# Patient Record
Sex: FEMALE | Race: WHITE
[De-identification: names, ages, dates, MRNs, and addresses within clinical notes are randomized per-mention and may not be internally consistent; named-entity substitution may affect disease eponyms.]

---

## 2017-09-26 ENCOUNTER — HOSPITAL ENCOUNTER (INPATIENT)
Dept: HOSPITAL 74 - YASAS | Age: 52
LOS: 21 days | Discharge: HOME | DRG: 772 | End: 2017-10-17
Attending: PSYCHIATRY & NEUROLOGY | Admitting: PSYCHIATRY & NEUROLOGY
Payer: COMMERCIAL

## 2017-09-26 VITALS — BODY MASS INDEX: 23.6 KG/M2

## 2017-09-26 DIAGNOSIS — I10: ICD-10-CM

## 2017-09-26 DIAGNOSIS — F11.20: Primary | ICD-10-CM

## 2017-09-26 DIAGNOSIS — F17.210: ICD-10-CM

## 2017-09-26 DIAGNOSIS — F10.20: ICD-10-CM

## 2017-09-26 DIAGNOSIS — F31.9: ICD-10-CM

## 2017-09-26 DIAGNOSIS — F43.10: ICD-10-CM

## 2017-09-26 DIAGNOSIS — K29.70: ICD-10-CM

## 2017-09-26 DIAGNOSIS — F14.20: ICD-10-CM

## 2017-09-26 PROCEDURE — HZ42ZZZ GROUP COUNSELING FOR SUBSTANCE ABUSE TREATMENT, COGNITIVE-BEHAVIORAL: ICD-10-PCS | Performed by: PSYCHIATRY & NEUROLOGY

## 2017-09-26 RX ADMIN — FLUOCINONIDE SCH APPLIC: 0.5 OINTMENT TOPICAL at 22:23

## 2017-09-26 RX ADMIN — Medication SCH MG: at 22:25

## 2017-09-26 RX ADMIN — METOPROLOL TARTRATE SCH MG: 50 TABLET, FILM COATED ORAL at 22:24

## 2017-09-26 RX ADMIN — IBUPROFEN PRN MG: 400 TABLET, FILM COATED ORAL at 19:20

## 2017-09-26 RX ADMIN — HYDROXYZINE PAMOATE PRN MG: 50 CAPSULE ORAL at 19:20
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
## 2017-09-27 LAB
ALBUMIN SERPL-MCNC: 3.3 G/DL (ref 3.4–5)
ALP SERPL-CCNC: 67 U/L (ref 45–117)
ALT SERPL-CCNC: 20 U/L (ref 12–78)
ANION GAP SERPL CALC-SCNC: 7 MMOL/L (ref 8–16)
APPEARANCE UR: (no result)
AST SERPL-CCNC: 17 U/L (ref 15–37)
BACTERIA #/AREA URNS HPF: (no result) /HPF
BILIRUB SERPL-MCNC: 0.2 MG/DL (ref 0.2–1)
BILIRUB UR STRIP.AUTO-MCNC: NEGATIVE MG/DL
CALCIUM SERPL-MCNC: 9.4 MG/DL (ref 8.5–10.1)
CAOX CRY URNS QL MICRO: (no result) /HPF
CO2 SERPL-SCNC: 30 MMOL/L (ref 21–32)
COLOR UR: (no result)
CREAT SERPL-MCNC: 0.6 MG/DL (ref 0.55–1.02)
DEPRECATED RDW RBC AUTO: 14.2 % (ref 11.6–15.6)
GLUCOSE SERPL-MCNC: 85 MG/DL (ref 74–106)
KETONES UR QL STRIP: NEGATIVE
LEUKOCYTE ESTERASE UR QL STRIP.AUTO: (no result)
MCH RBC QN AUTO: 25.3 PG (ref 25.7–33.7)
MCHC RBC AUTO-ENTMCNC: 31.2 G/DL (ref 32–36)
MCV RBC: 81.2 FL (ref 80–96)
NITRITE UR QL STRIP: NEGATIVE
PH UR: 6 [PH] (ref 5–8)
PLATELET # BLD AUTO: 186 K/MM3 (ref 134–434)
PMV BLD: 10 FL (ref 7.5–11.1)
PROT SERPL-MCNC: 6.2 G/DL (ref 6.4–8.2)
PROT UR QL STRIP: NEGATIVE
PROT UR QL STRIP: NEGATIVE
RBC # BLD AUTO: 3 /HPF (ref 0–3)
RBC # UR STRIP: NEGATIVE /UL
SP GR UR: 1.01 (ref 1–1.02)
UROBILINOGEN UR STRIP-MCNC: NEGATIVE MG/DL (ref 0.2–1)
WBC # BLD AUTO: 4.8 K/MM3 (ref 4–10)
WBC # UR AUTO: 8 /HPF (ref 3–5)

## 2017-09-27 RX ADMIN — NICOTINE SCH: 21 PATCH TRANSDERMAL at 09:54

## 2017-09-27 RX ADMIN — AMITRIPTYLINE HYDROCHLORIDE SCH MG: 25 TABLET, FILM COATED ORAL at 21:56

## 2017-09-27 RX ADMIN — FLUOCINONIDE SCH APPLIC: 0.5 OINTMENT TOPICAL at 13:19

## 2017-09-27 RX ADMIN — ESCITALOPRAM OXALATE SCH MG: 10 TABLET, FILM COATED ORAL at 13:19

## 2017-09-27 RX ADMIN — FLUOCINONIDE SCH: 0.5 OINTMENT TOPICAL at 06:53

## 2017-09-27 RX ADMIN — AMITRIPTYLINE HYDROCHLORIDE SCH MG: 25 TABLET, FILM COATED ORAL at 13:19

## 2017-09-27 RX ADMIN — FLUOCINONIDE SCH APPLIC: 0.5 OINTMENT TOPICAL at 21:57

## 2017-09-27 RX ADMIN — PANTOPRAZOLE SODIUM SCH MG: 20 TABLET, DELAYED RELEASE ORAL at 09:54

## 2017-09-27 RX ADMIN — LIDOCAINE SCH PATCH: 50 PATCH TOPICAL at 09:54

## 2017-09-27 RX ADMIN — HYDROXYZINE PAMOATE PRN MG: 50 CAPSULE ORAL at 08:58

## 2017-09-27 RX ADMIN — Medication SCH TAB: at 09:54

## 2017-09-27 RX ADMIN — Medication SCH MG: at 21:56

## 2017-09-27 RX ADMIN — Medication SCH: at 21:57

## 2017-09-27 RX ADMIN — METOPROLOL TARTRATE SCH MG: 50 TABLET, FILM COATED ORAL at 21:56

## 2017-09-27 RX ADMIN — ASPIRIN SCH MG: 81 TABLET, COATED ORAL at 09:53

## 2017-09-27 RX ADMIN — METOPROLOL TARTRATE SCH MG: 50 TABLET, FILM COATED ORAL at 09:54

## 2017-09-27 RX ADMIN — IBUPROFEN PRN MG: 400 TABLET, FILM COATED ORAL at 21:58
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
## 2017-09-27 NOTE — EKG
Test Reason : 

Blood Pressure : ***/*** mmHG

Vent. Rate : 066 BPM     Atrial Rate : 066 BPM

   P-R Int : 180 ms          QRS Dur : 092 ms

    QT Int : 416 ms       P-R-T Axes : 058 064 015 degrees

   QTc Int : 436 ms

 

NORMAL SINUS RHYTHM

MINIMAL VOLTAGE CRITERIA FOR LVH, MAY BE NORMAL VARIANT

BORDERLINE ECG

NO PREVIOUS ECGS AVAILABLE

Confirmed by JIGAR ORTIZ MD ([id number]) on 9/27/2017 9:50:55 AM

 

Referred By:             Confirmed By:JIGAR ORTIZ MD

## 2017-09-28 RX ADMIN — AMITRIPTYLINE HYDROCHLORIDE SCH MG: 25 TABLET, FILM COATED ORAL at 21:45

## 2017-09-28 RX ADMIN — Medication SCH MG: at 21:45

## 2017-09-28 RX ADMIN — Medication SCH EACH: at 21:45

## 2017-09-28 RX ADMIN — HYDROXYZINE PAMOATE PRN MG: 50 CAPSULE ORAL at 21:47

## 2017-09-28 RX ADMIN — LIDOCAINE SCH PATCH: 50 PATCH TOPICAL at 09:03

## 2017-09-28 RX ADMIN — IBUPROFEN PRN MG: 400 TABLET, FILM COATED ORAL at 22:03

## 2017-09-28 RX ADMIN — AMLODIPINE BESYLATE SCH MG: 10 TABLET ORAL at 15:54

## 2017-09-28 RX ADMIN — METOPROLOL TARTRATE SCH MG: 50 TABLET, FILM COATED ORAL at 21:45

## 2017-09-28 RX ADMIN — FLUOCINONIDE SCH APPLIC: 0.5 OINTMENT TOPICAL at 06:45

## 2017-09-28 RX ADMIN — METOPROLOL TARTRATE SCH MG: 50 TABLET, FILM COATED ORAL at 09:02

## 2017-09-28 RX ADMIN — ASPIRIN SCH MG: 81 TABLET, COATED ORAL at 09:02

## 2017-09-28 RX ADMIN — FLUOCINONIDE SCH: 0.5 OINTMENT TOPICAL at 13:08

## 2017-09-28 RX ADMIN — AMITRIPTYLINE HYDROCHLORIDE SCH MG: 25 TABLET, FILM COATED ORAL at 13:08

## 2017-09-28 RX ADMIN — HYDROXYZINE PAMOATE PRN MG: 50 CAPSULE ORAL at 07:40

## 2017-09-28 RX ADMIN — AMITRIPTYLINE HYDROCHLORIDE SCH MG: 25 TABLET, FILM COATED ORAL at 06:44

## 2017-09-28 RX ADMIN — Medication SCH TAB: at 09:02

## 2017-09-28 RX ADMIN — ESCITALOPRAM OXALATE SCH MG: 10 TABLET, FILM COATED ORAL at 09:02

## 2017-09-28 RX ADMIN — FLUOCINONIDE SCH: 0.5 OINTMENT TOPICAL at 21:45

## 2017-09-28 RX ADMIN — NICOTINE SCH: 21 PATCH TRANSDERMAL at 09:04

## 2017-09-28 RX ADMIN — PANTOPRAZOLE SODIUM SCH MG: 20 TABLET, DELAYED RELEASE ORAL at 09:02

## 2017-09-29 RX ADMIN — FLUOCINONIDE SCH APPLIC: 0.5 OINTMENT TOPICAL at 15:05

## 2017-09-29 RX ADMIN — LIDOCAINE SCH PATCH: 50 PATCH TOPICAL at 10:40

## 2017-09-29 RX ADMIN — AMLODIPINE BESYLATE SCH: 10 TABLET ORAL at 11:07

## 2017-09-29 RX ADMIN — AMLODIPINE BESYLATE SCH MG: 10 TABLET ORAL at 08:27

## 2017-09-29 RX ADMIN — NICOTINE SCH: 21 PATCH TRANSDERMAL at 10:40

## 2017-09-29 RX ADMIN — PANTOPRAZOLE SODIUM SCH MG: 20 TABLET, DELAYED RELEASE ORAL at 10:39

## 2017-09-29 RX ADMIN — AMITRIPTYLINE HYDROCHLORIDE SCH MG: 25 TABLET, FILM COATED ORAL at 21:42

## 2017-09-29 RX ADMIN — FLUOCINONIDE SCH APPLIC: 0.5 OINTMENT TOPICAL at 21:43

## 2017-09-29 RX ADMIN — METOPROLOL TARTRATE SCH MG: 50 TABLET, FILM COATED ORAL at 21:42

## 2017-09-29 RX ADMIN — Medication SCH EACH: at 21:43

## 2017-09-29 RX ADMIN — QUETIAPINE FUMARATE SCH: 25 TABLET ORAL at 17:08

## 2017-09-29 RX ADMIN — GABAPENTIN SCH MG: 300 CAPSULE ORAL at 21:42

## 2017-09-29 RX ADMIN — AMITRIPTYLINE HYDROCHLORIDE SCH MG: 25 TABLET, FILM COATED ORAL at 06:20

## 2017-09-29 RX ADMIN — METOPROLOL TARTRATE SCH MG: 50 TABLET, FILM COATED ORAL at 10:39

## 2017-09-29 RX ADMIN — ESCITALOPRAM OXALATE SCH MG: 10 TABLET, FILM COATED ORAL at 10:39

## 2017-09-29 RX ADMIN — FLUOCINONIDE SCH: 0.5 OINTMENT TOPICAL at 06:41

## 2017-09-29 RX ADMIN — Medication SCH TAB: at 10:39

## 2017-09-29 RX ADMIN — GABAPENTIN SCH MG: 300 CAPSULE ORAL at 15:05

## 2017-09-29 RX ADMIN — AMITRIPTYLINE HYDROCHLORIDE SCH MG: 25 TABLET, FILM COATED ORAL at 15:04

## 2017-09-29 RX ADMIN — Medication SCH MG: at 21:42

## 2017-09-29 RX ADMIN — QUETIAPINE FUMARATE SCH MG: 25 TABLET ORAL at 15:04

## 2017-09-29 RX ADMIN — QUETIAPINE FUMARATE SCH MG: 100 TABLET ORAL at 21:42

## 2017-09-29 RX ADMIN — ASPIRIN SCH MG: 81 TABLET, COATED ORAL at 10:39

## 2017-09-29 NOTE — PN
Psychiatric Progress Note


Vital Signs: 


 Vital Signs











 Period  Temp  Pulse  Resp  BP Sys/[place]inas  Pulse Ox


 


 Last 24 Hr  97.8 F  52-56  16-16  162-162/92-96  











Date of Session: 09/29/17


Chief Complaint:: Bryant nervious and still suffering from sleeping difficulties. 


HPI: Alcohol,Cocaine and Opioid ependence comorbid with Bipolar disorder,PTSD.


ROS: Significant for Gastritis.


Current Medications: 


Active Medications











Generic Name Dose Route Start Last Admin





  Trade Name Freq  PRN Reason Stop Dose Admin


 


Acetaminophen  650 mg 09/26/17 16:50  





  Tylenol -  PO   





  Q4H PRN   





  PAIN   


 


Al Hydroxide/Mg Hydroxide  30 ml 09/26/17 16:50  





  Mylanta Oral Suspension -  PO   





  Q6H PRN   





  DYSPEPSIA   


 


Amitriptyline HCl  25 mg 09/27/17 14:00 09/29/17 15:04





  Elavil -  PO   25 mg





  TID ONUR   Administration


 


Amlodipine Besylate  10 mg 09/28/17 15:45 09/29/17 11:07





  Norvasc -  PO   Not Given





  DAILY ONUR   


 


Aspirin  81 mg 09/27/17 10:00 09/29/17 10:39





  Ecotrin -  PO   81 mg





  DAILY ONUR   Administration


 


Clonidine  0.1 mg 09/27/17 10:00 09/29/17 11:05





  Catapres -  PO   Not Given





  DAILY ONUR   


 


Diphenhydramine HCl  50 mg 09/26/17 16:50 09/26/17 22:24





  Benadryl -  PO   50 mg





  HSMR1 PRN   Administration





  INSOMNIA   


 


Escitalopram Oxalate  10 mg 09/27/17 12:00 09/29/17 10:39





  Lexapro -  PO   10 mg





  DAILY ONUR   Administration


 


Eucalyptus/Menthol/Phenol/Sorbitol  1 each 09/26/17 16:50  





  Cepastat Lozenge -  MM   





  Q4H PRN   





  SORE THROAT   


 


Fluocinonide  1 applic 09/26/17 22:00 09/29/17 15:05





  Lidex 0.05% Ointment -  TP   1 applic





  TID ONUR   Administration


 


Gabapentin  300 mg 09/29/17 14:00 09/29/17 15:05





  Neurontin -  PO   300 mg





  TID ONUR   Administration


 


Guaifenesin  10 ml 09/26/17 16:50  





  Robitussin Dm -  PO   





  Q6H PRN   





  COUGH   


 


Hydroxyzine Pamoate  50 mg 09/26/17 16:50 09/28/17 21:47





  Vistaril -  PO   50 mg





  Q4H PRN   Administration





  AGITATION   


 


Ibuprofen  400 mg 09/26/17 16:50 09/28/17 22:03





  Motrin -  PO   400 mg





  Q6H PRN   Administration





  SEVERE PAIN   


 


Lidocaine  1 patch 09/27/17 10:00 09/29/17 10:40





  Lidoderm Patch -  TP   1 patch





  DAILY ONUR   Administration


 


Loperamide HCl  4 mg 09/26/17 16:50  





  Imodium -  PO   





  Q6H PRN   





  DIARRHEA   


 


Magnesium Citrate  300 ml 09/26/17 16:50  





  Citroma -  PO   





  Q48H PRN   





  CONSTIPATION   


 


Magnesium Hydroxide  30 ml 09/26/17 16:50  





  Milk Of Magnesia -  PO   





  DAILY PRN   





  CONSTIPATION   


 


Metoprolol Tartrate  50 mg 09/26/17 22:00 09/29/17 10:39





  Lopressor -  PO   50 mg





  BID ONUR   Administration


 


Miscellaneous  1 each 09/27/17 22:00 09/28/17 21:45





  Lidoderm Patch Removal  MC   1 each





  DAILY@2200 ONUR   Administration


 


Nicotine  21 mg 09/27/17 10:00 09/29/17 10:40





  Nicoderm Patch -  TD   Not Given





  DAILY ONUR   


 


Nicotine Polacrilex  4 mg 09/26/17 16:50 09/27/17 09:55





  Nicorette Gum -  BC   4 mg





  Q2H PRN   Administration





  NICOTINE REPLACEMENT RX   


 


Pantoprazole Sodium  20 mg 09/27/17 10:00 09/29/17 10:39





  Protonix -  PO   20 mg





  DAILY ONUR   Administration


 


Prenatal Multivit/Folic Acid/Iron  1 tab 09/27/17 10:00 09/29/17 10:39





  Prenatal Vitamins (Sjr) -  PO   1 tab





  DAILY ONUR   Administration


 


Pseudoephedrine/Triprolidine  1 combo 09/26/17 16:50  





  Actifed -  PO   





  TID PRN   





  NASAL CONGESTION   


 


Quetiapine Fumarate  25 mg 09/29/17 14:00 09/29/17 15:04





  Seroquel -  PO   25 mg





  TID@1000,1400,1800 ONUR   Administration


 


Quetiapine Fumarate  100 mg 09/29/17 22:00  





  Seroquel -  PO   





  HS ONUR   


 


Thiamine HCl  100 mg 09/26/17 22:00 09/28/17 21:45





  Vitamin B1 -  PO   100 mg





  HS ONUR   Administration











Current Side Effect: No


Lab tests ordered: No


Lab tests reviewed: Yes


Provider note:: Chart was revuewed,patient was seen.Traetmen plan and 

medication managemnt  has been discussed with the patient.properties of 

Seroquel and Neurontin has discussed incliding side effects,benefits and dose 

adjustemnt.Seroquel 25 mg po tid and 100 mg po hs,Neurontin 300 mg po tid will 

be started today.Continue Lexapro 10 mg po daily and ElVIL 25 MG PO TID.  

SUPPORTIVE THERAPY PROVIDED.  Supportive therapy provided.


Total face to face time:: 30





Mental Status Exam





- Mental Status Exam


Alert and Oriented to: Time, Place, Person


Cognitive Function: Grossly Intact


Patient Appearance: Unkempt


Mood: Sad, Nervous


Affect: Mood Congruent, Labile


Patient Behavior: Cooperative


Speech Pattern: Clear


Voice Loudness: Normal


Thought Process: Goal Oriented


Thought Disorder: Not Present


Hallucinations: Denies


Suicidal Ideation: Denies


Homicidal Ideation: Denies


Insight/Judgement: Fair


Sleep: Difficulty falling asleep


Appetite: Good


Muscle strength/Tone: Normal


Gait/Station: Normal





Psychiatric Treatment Plan





- Problem List


(1) Cocaine dependence


Current Visit: Yes   Qualifiers: 


     Substance use status: uncomplicated        Qualified Code(s): F14.20 - 

Cocaine dependence, uncomplicated  





(2) Gastritis


Current Visit: Yes   Qualifiers: 


     Chronicity: unspecified     Gastritis bleeding: without bleeding





(3) Hypertension


Current Visit: Yes   Qualifiers: 


     Hypertension type: essential hypertension        Qualified Code(s): I10 - 

Essential (primary) hypertension  





(4) Nicotine dependence


Current Visit: Yes   Qualifiers: 


     Nicotine product type: cigarettes     Substance use status: uncomplicated 

       Qualified Code(s): F17.210 - Nicotine dependence, cigarettes, 

uncomplicated  





(5) PTSD (post-traumatic stress disorder)


Current Visit: Yes





(6) Bipolar disorder


Current Visit: Yes   Qualifiers: 


     Current episode severity: unspecified





(7) Heroin dependence


Current Visit: Yes





(8) Alcohol dependence


Current Visit: Yes

## 2017-09-30 RX ADMIN — PANTOPRAZOLE SODIUM SCH MG: 20 TABLET, DELAYED RELEASE ORAL at 10:18

## 2017-09-30 RX ADMIN — GABAPENTIN SCH MG: 300 CAPSULE ORAL at 06:39

## 2017-09-30 RX ADMIN — AMLODIPINE BESYLATE SCH MG: 10 TABLET ORAL at 10:17

## 2017-09-30 RX ADMIN — NICOTINE SCH: 21 PATCH TRANSDERMAL at 10:19

## 2017-09-30 RX ADMIN — FLUOCINONIDE SCH: 0.5 OINTMENT TOPICAL at 21:33

## 2017-09-30 RX ADMIN — Medication SCH MG: at 21:32

## 2017-09-30 RX ADMIN — ASPIRIN SCH MG: 81 TABLET, COATED ORAL at 10:18

## 2017-09-30 RX ADMIN — QUETIAPINE FUMARATE SCH MG: 100 TABLET ORAL at 21:32

## 2017-09-30 RX ADMIN — LIDOCAINE SCH PATCH: 50 PATCH TOPICAL at 10:17

## 2017-09-30 RX ADMIN — METOPROLOL TARTRATE SCH MG: 50 TABLET, FILM COATED ORAL at 21:32

## 2017-09-30 RX ADMIN — QUETIAPINE FUMARATE SCH MG: 25 TABLET ORAL at 13:39

## 2017-09-30 RX ADMIN — QUETIAPINE FUMARATE SCH MG: 25 TABLET ORAL at 10:18

## 2017-09-30 RX ADMIN — Medication SCH TAB: at 10:18

## 2017-09-30 RX ADMIN — GABAPENTIN SCH MG: 300 CAPSULE ORAL at 13:39

## 2017-09-30 RX ADMIN — AMITRIPTYLINE HYDROCHLORIDE SCH MG: 25 TABLET, FILM COATED ORAL at 06:39

## 2017-09-30 RX ADMIN — FLUOCINONIDE SCH: 0.5 OINTMENT TOPICAL at 13:40

## 2017-09-30 RX ADMIN — METOPROLOL TARTRATE SCH MG: 50 TABLET, FILM COATED ORAL at 10:18

## 2017-09-30 RX ADMIN — Medication SCH EACH: at 21:33

## 2017-09-30 RX ADMIN — ESCITALOPRAM OXALATE SCH MG: 10 TABLET, FILM COATED ORAL at 10:18

## 2017-09-30 RX ADMIN — AMITRIPTYLINE HYDROCHLORIDE SCH MG: 25 TABLET, FILM COATED ORAL at 21:32

## 2017-09-30 RX ADMIN — QUETIAPINE FUMARATE SCH MG: 25 TABLET ORAL at 17:15

## 2017-09-30 RX ADMIN — FLUOCINONIDE SCH APPLIC: 0.5 OINTMENT TOPICAL at 06:40

## 2017-09-30 RX ADMIN — GABAPENTIN SCH MG: 300 CAPSULE ORAL at 21:32

## 2017-09-30 RX ADMIN — AMITRIPTYLINE HYDROCHLORIDE SCH MG: 25 TABLET, FILM COATED ORAL at 13:39
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
## 2017-10-01 RX ADMIN — Medication SCH TAB: at 09:08

## 2017-10-01 RX ADMIN — METOPROLOL TARTRATE SCH MG: 50 TABLET, FILM COATED ORAL at 21:42

## 2017-10-01 RX ADMIN — GABAPENTIN SCH MG: 300 CAPSULE ORAL at 13:24

## 2017-10-01 RX ADMIN — PANTOPRAZOLE SODIUM SCH MG: 20 TABLET, DELAYED RELEASE ORAL at 09:08

## 2017-10-01 RX ADMIN — QUETIAPINE FUMARATE SCH MG: 100 TABLET ORAL at 21:42

## 2017-10-01 RX ADMIN — QUETIAPINE FUMARATE SCH MG: 25 TABLET ORAL at 13:24

## 2017-10-01 RX ADMIN — Medication SCH: at 21:43

## 2017-10-01 RX ADMIN — AMITRIPTYLINE HYDROCHLORIDE SCH MG: 25 TABLET, FILM COATED ORAL at 21:42

## 2017-10-01 RX ADMIN — FLUOCINONIDE SCH APPLIC: 0.5 OINTMENT TOPICAL at 13:24

## 2017-10-01 RX ADMIN — METOPROLOL TARTRATE SCH MG: 50 TABLET, FILM COATED ORAL at 09:08

## 2017-10-01 RX ADMIN — QUETIAPINE FUMARATE SCH MG: 25 TABLET ORAL at 17:45

## 2017-10-01 RX ADMIN — NICOTINE SCH: 21 PATCH TRANSDERMAL at 09:09

## 2017-10-01 RX ADMIN — ASPIRIN SCH MG: 81 TABLET, COATED ORAL at 09:08

## 2017-10-01 RX ADMIN — GABAPENTIN SCH MG: 300 CAPSULE ORAL at 06:46

## 2017-10-01 RX ADMIN — AMITRIPTYLINE HYDROCHLORIDE SCH MG: 25 TABLET, FILM COATED ORAL at 13:24

## 2017-10-01 RX ADMIN — GABAPENTIN SCH MG: 300 CAPSULE ORAL at 21:42

## 2017-10-01 RX ADMIN — ESCITALOPRAM OXALATE SCH MG: 10 TABLET, FILM COATED ORAL at 09:08

## 2017-10-01 RX ADMIN — FLUOCINONIDE SCH: 0.5 OINTMENT TOPICAL at 21:43

## 2017-10-01 RX ADMIN — Medication SCH MG: at 21:42

## 2017-10-01 RX ADMIN — QUETIAPINE FUMARATE SCH MG: 25 TABLET ORAL at 09:08

## 2017-10-01 RX ADMIN — FLUOCINONIDE SCH: 0.5 OINTMENT TOPICAL at 06:50

## 2017-10-01 RX ADMIN — AMITRIPTYLINE HYDROCHLORIDE SCH MG: 25 TABLET, FILM COATED ORAL at 06:46

## 2017-10-01 RX ADMIN — LIDOCAINE SCH PATCH: 50 PATCH TOPICAL at 09:08

## 2017-10-01 RX ADMIN — AMLODIPINE BESYLATE SCH MG: 10 TABLET ORAL at 09:08

## 2017-10-02 RX ADMIN — QUETIAPINE FUMARATE SCH MG: 25 TABLET ORAL at 13:20

## 2017-10-02 RX ADMIN — QUETIAPINE FUMARATE SCH MG: 25 TABLET ORAL at 18:10

## 2017-10-02 RX ADMIN — METOPROLOL TARTRATE SCH MG: 50 TABLET, FILM COATED ORAL at 10:06

## 2017-10-02 RX ADMIN — AMITRIPTYLINE HYDROCHLORIDE SCH MG: 25 TABLET, FILM COATED ORAL at 13:20

## 2017-10-02 RX ADMIN — ESCITALOPRAM OXALATE SCH MG: 10 TABLET, FILM COATED ORAL at 10:06

## 2017-10-02 RX ADMIN — GABAPENTIN SCH MG: 300 CAPSULE ORAL at 06:40

## 2017-10-02 RX ADMIN — FLUOCINONIDE SCH APPLIC: 0.5 OINTMENT TOPICAL at 06:40

## 2017-10-02 RX ADMIN — ASPIRIN SCH MG: 81 TABLET, COATED ORAL at 10:05

## 2017-10-02 RX ADMIN — FLUOCINONIDE SCH: 0.5 OINTMENT TOPICAL at 22:32

## 2017-10-02 RX ADMIN — PANTOPRAZOLE SODIUM SCH MG: 20 TABLET, DELAYED RELEASE ORAL at 10:06

## 2017-10-02 RX ADMIN — FLUOCINONIDE SCH APPLIC: 0.5 OINTMENT TOPICAL at 13:20

## 2017-10-02 RX ADMIN — QUETIAPINE FUMARATE SCH MG: 25 TABLET ORAL at 10:06

## 2017-10-02 RX ADMIN — QUETIAPINE FUMARATE SCH MG: 100 TABLET ORAL at 22:05

## 2017-10-02 RX ADMIN — METOPROLOL TARTRATE SCH MG: 50 TABLET, FILM COATED ORAL at 22:05

## 2017-10-02 RX ADMIN — NICOTINE SCH: 21 PATCH TRANSDERMAL at 10:06

## 2017-10-02 RX ADMIN — GABAPENTIN SCH MG: 300 CAPSULE ORAL at 13:20

## 2017-10-02 RX ADMIN — Medication SCH: at 22:32

## 2017-10-02 RX ADMIN — Medication SCH TAB: at 10:06

## 2017-10-02 RX ADMIN — LIDOCAINE SCH PATCH: 50 PATCH TOPICAL at 10:05

## 2017-10-02 RX ADMIN — Medication SCH MG: at 22:06

## 2017-10-02 RX ADMIN — HYDROXYZINE PAMOATE PRN MG: 50 CAPSULE ORAL at 18:12

## 2017-10-02 RX ADMIN — AMITRIPTYLINE HYDROCHLORIDE SCH MG: 25 TABLET, FILM COATED ORAL at 22:06

## 2017-10-02 RX ADMIN — GABAPENTIN SCH MG: 300 CAPSULE ORAL at 22:05

## 2017-10-02 RX ADMIN — AMLODIPINE BESYLATE SCH MG: 10 TABLET ORAL at 10:06

## 2017-10-02 RX ADMIN — AMITRIPTYLINE HYDROCHLORIDE SCH MG: 25 TABLET, FILM COATED ORAL at 06:40

## 2017-10-03 RX ADMIN — Medication SCH MG: at 21:32

## 2017-10-03 RX ADMIN — FLUOCINONIDE SCH: 0.5 OINTMENT TOPICAL at 13:20

## 2017-10-03 RX ADMIN — AMITRIPTYLINE HYDROCHLORIDE SCH MG: 25 TABLET, FILM COATED ORAL at 21:32

## 2017-10-03 RX ADMIN — GABAPENTIN SCH MG: 300 CAPSULE ORAL at 21:32

## 2017-10-03 RX ADMIN — GABAPENTIN SCH MG: 300 CAPSULE ORAL at 13:20

## 2017-10-03 RX ADMIN — GABAPENTIN SCH MG: 300 CAPSULE ORAL at 06:26

## 2017-10-03 RX ADMIN — METOPROLOL TARTRATE SCH MG: 50 TABLET, FILM COATED ORAL at 21:32

## 2017-10-03 RX ADMIN — QUETIAPINE FUMARATE SCH MG: 100 TABLET ORAL at 21:32

## 2017-10-03 RX ADMIN — FLUOCINONIDE SCH: 0.5 OINTMENT TOPICAL at 06:26

## 2017-10-03 RX ADMIN — PANTOPRAZOLE SODIUM SCH MG: 20 TABLET, DELAYED RELEASE ORAL at 10:31

## 2017-10-03 RX ADMIN — AMITRIPTYLINE HYDROCHLORIDE SCH MG: 25 TABLET, FILM COATED ORAL at 13:20

## 2017-10-03 RX ADMIN — METOPROLOL TARTRATE SCH MG: 50 TABLET, FILM COATED ORAL at 10:31

## 2017-10-03 RX ADMIN — Medication SCH TAB: at 10:30

## 2017-10-03 RX ADMIN — LIDOCAINE SCH PATCH: 50 PATCH TOPICAL at 10:30

## 2017-10-03 RX ADMIN — Medication SCH EACH: at 21:33

## 2017-10-03 RX ADMIN — QUETIAPINE FUMARATE SCH MG: 25 TABLET ORAL at 10:31

## 2017-10-03 RX ADMIN — AMITRIPTYLINE HYDROCHLORIDE SCH MG: 25 TABLET, FILM COATED ORAL at 06:26

## 2017-10-03 RX ADMIN — ACETAMINOPHEN PRN MG: 325 TABLET ORAL at 06:27

## 2017-10-03 RX ADMIN — FLUOCINONIDE SCH: 0.5 OINTMENT TOPICAL at 21:32

## 2017-10-03 RX ADMIN — AMLODIPINE BESYLATE SCH MG: 10 TABLET ORAL at 10:31

## 2017-10-03 RX ADMIN — ESCITALOPRAM OXALATE SCH MG: 10 TABLET, FILM COATED ORAL at 10:30

## 2017-10-03 RX ADMIN — ASPIRIN SCH MG: 81 TABLET, COATED ORAL at 10:30

## 2017-10-03 RX ADMIN — NICOTINE SCH: 21 PATCH TRANSDERMAL at 10:31

## 2017-10-03 RX ADMIN — QUETIAPINE FUMARATE SCH MG: 25 TABLET ORAL at 17:45

## 2017-10-03 RX ADMIN — QUETIAPINE FUMARATE SCH MG: 25 TABLET ORAL at 13:20

## 2017-10-04 RX ADMIN — AMITRIPTYLINE HYDROCHLORIDE SCH MG: 25 TABLET, FILM COATED ORAL at 21:50

## 2017-10-04 RX ADMIN — FLUOCINONIDE SCH: 0.5 OINTMENT TOPICAL at 21:51

## 2017-10-04 RX ADMIN — AMITRIPTYLINE HYDROCHLORIDE SCH MG: 25 TABLET, FILM COATED ORAL at 13:20

## 2017-10-04 RX ADMIN — QUETIAPINE FUMARATE SCH MG: 25 TABLET ORAL at 13:20

## 2017-10-04 RX ADMIN — Medication SCH TAB: at 10:42

## 2017-10-04 RX ADMIN — NICOTINE SCH: 21 PATCH TRANSDERMAL at 10:43

## 2017-10-04 RX ADMIN — METOPROLOL TARTRATE SCH MG: 50 TABLET, FILM COATED ORAL at 10:42

## 2017-10-04 RX ADMIN — QUETIAPINE FUMARATE SCH MG: 25 TABLET ORAL at 10:42

## 2017-10-04 RX ADMIN — AMLODIPINE BESYLATE SCH MG: 10 TABLET ORAL at 10:42

## 2017-10-04 RX ADMIN — QUETIAPINE FUMARATE SCH MG: 25 TABLET ORAL at 17:50

## 2017-10-04 RX ADMIN — METOPROLOL TARTRATE SCH MG: 50 TABLET, FILM COATED ORAL at 21:51

## 2017-10-04 RX ADMIN — FLUOCINONIDE SCH APPLIC: 0.5 OINTMENT TOPICAL at 06:47

## 2017-10-04 RX ADMIN — ESCITALOPRAM OXALATE SCH MG: 10 TABLET, FILM COATED ORAL at 10:42

## 2017-10-04 RX ADMIN — QUETIAPINE FUMARATE SCH MG: 100 TABLET ORAL at 21:50

## 2017-10-04 RX ADMIN — GABAPENTIN SCH MG: 300 CAPSULE ORAL at 13:15

## 2017-10-04 RX ADMIN — GABAPENTIN SCH MG: 300 CAPSULE ORAL at 06:47

## 2017-10-04 RX ADMIN — FLUOCINONIDE SCH: 0.5 OINTMENT TOPICAL at 13:21

## 2017-10-04 RX ADMIN — Medication SCH EACH: at 21:51

## 2017-10-04 RX ADMIN — AMITRIPTYLINE HYDROCHLORIDE SCH MG: 25 TABLET, FILM COATED ORAL at 06:47

## 2017-10-04 RX ADMIN — Medication SCH MG: at 21:50

## 2017-10-04 RX ADMIN — GABAPENTIN SCH MG: 300 CAPSULE ORAL at 21:50

## 2017-10-04 RX ADMIN — PANTOPRAZOLE SODIUM SCH MG: 20 TABLET, DELAYED RELEASE ORAL at 10:42

## 2017-10-04 RX ADMIN — LIDOCAINE SCH PATCH: 50 PATCH TOPICAL at 10:43

## 2017-10-04 RX ADMIN — ASPIRIN SCH MG: 81 TABLET, COATED ORAL at 10:42

## 2017-10-05 RX ADMIN — FLUOCINONIDE SCH APPLIC: 0.5 OINTMENT TOPICAL at 13:09

## 2017-10-05 RX ADMIN — ESCITALOPRAM OXALATE SCH MG: 10 TABLET, FILM COATED ORAL at 10:18

## 2017-10-05 RX ADMIN — NICOTINE SCH: 21 PATCH TRANSDERMAL at 10:18

## 2017-10-05 RX ADMIN — METOPROLOL TARTRATE SCH MG: 50 TABLET, FILM COATED ORAL at 10:18

## 2017-10-05 RX ADMIN — METOPROLOL TARTRATE SCH MG: 50 TABLET, FILM COATED ORAL at 21:31

## 2017-10-05 RX ADMIN — QUETIAPINE FUMARATE SCH MG: 100 TABLET ORAL at 21:31

## 2017-10-05 RX ADMIN — QUETIAPINE FUMARATE SCH MG: 25 TABLET ORAL at 13:08

## 2017-10-05 RX ADMIN — Medication SCH MG: at 21:32

## 2017-10-05 RX ADMIN — FLUOCINONIDE SCH APPLIC: 0.5 OINTMENT TOPICAL at 21:32

## 2017-10-05 RX ADMIN — FLUOCINONIDE SCH: 0.5 OINTMENT TOPICAL at 06:47

## 2017-10-05 RX ADMIN — Medication SCH TAB: at 10:18

## 2017-10-05 RX ADMIN — GABAPENTIN SCH MG: 300 CAPSULE ORAL at 06:46

## 2017-10-05 RX ADMIN — AMLODIPINE BESYLATE SCH MG: 10 TABLET ORAL at 10:19

## 2017-10-05 RX ADMIN — Medication SCH EACH: at 21:32

## 2017-10-05 RX ADMIN — LIDOCAINE SCH PATCH: 50 PATCH TOPICAL at 10:18

## 2017-10-05 RX ADMIN — AMITRIPTYLINE HYDROCHLORIDE SCH MG: 25 TABLET, FILM COATED ORAL at 13:08

## 2017-10-05 RX ADMIN — PANTOPRAZOLE SODIUM SCH MG: 20 TABLET, DELAYED RELEASE ORAL at 10:19

## 2017-10-05 RX ADMIN — QUETIAPINE FUMARATE SCH MG: 25 TABLET ORAL at 18:35

## 2017-10-05 RX ADMIN — ASPIRIN SCH MG: 81 TABLET, COATED ORAL at 10:19

## 2017-10-05 RX ADMIN — AMITRIPTYLINE HYDROCHLORIDE SCH MG: 25 TABLET, FILM COATED ORAL at 21:31

## 2017-10-05 RX ADMIN — QUETIAPINE FUMARATE SCH MG: 25 TABLET ORAL at 10:19

## 2017-10-05 RX ADMIN — AMITRIPTYLINE HYDROCHLORIDE SCH MG: 25 TABLET, FILM COATED ORAL at 06:46

## 2017-10-05 RX ADMIN — GABAPENTIN SCH MG: 300 CAPSULE ORAL at 13:08

## 2017-10-05 RX ADMIN — GABAPENTIN SCH MG: 300 CAPSULE ORAL at 21:31

## 2017-10-06 RX ADMIN — FLUOCINONIDE SCH APPLIC: 0.5 OINTMENT TOPICAL at 06:45

## 2017-10-06 RX ADMIN — ASPIRIN SCH MG: 81 TABLET, COATED ORAL at 10:45

## 2017-10-06 RX ADMIN — FLUOCINONIDE SCH APPLIC: 0.5 OINTMENT TOPICAL at 21:45

## 2017-10-06 RX ADMIN — AMITRIPTYLINE HYDROCHLORIDE SCH MG: 25 TABLET, FILM COATED ORAL at 13:27

## 2017-10-06 RX ADMIN — QUETIAPINE FUMARATE SCH MG: 25 TABLET ORAL at 17:25

## 2017-10-06 RX ADMIN — QUETIAPINE FUMARATE SCH MG: 100 TABLET ORAL at 21:44

## 2017-10-06 RX ADMIN — AMITRIPTYLINE HYDROCHLORIDE SCH MG: 25 TABLET, FILM COATED ORAL at 06:44

## 2017-10-06 RX ADMIN — LIDOCAINE SCH PATCH: 50 PATCH TOPICAL at 10:43

## 2017-10-06 RX ADMIN — QUETIAPINE FUMARATE SCH MG: 25 TABLET ORAL at 13:28

## 2017-10-06 RX ADMIN — GABAPENTIN SCH MG: 300 CAPSULE ORAL at 13:28

## 2017-10-06 RX ADMIN — Medication SCH EACH: at 21:30

## 2017-10-06 RX ADMIN — AMITRIPTYLINE HYDROCHLORIDE SCH MG: 25 TABLET, FILM COATED ORAL at 21:44

## 2017-10-06 RX ADMIN — GABAPENTIN SCH MG: 300 CAPSULE ORAL at 06:44

## 2017-10-06 RX ADMIN — AMLODIPINE BESYLATE SCH MG: 10 TABLET ORAL at 10:44

## 2017-10-06 RX ADMIN — PANTOPRAZOLE SODIUM SCH MG: 20 TABLET, DELAYED RELEASE ORAL at 10:45

## 2017-10-06 RX ADMIN — NICOTINE SCH: 21 PATCH TRANSDERMAL at 10:44

## 2017-10-06 RX ADMIN — ESCITALOPRAM OXALATE SCH MG: 10 TABLET, FILM COATED ORAL at 10:45

## 2017-10-06 RX ADMIN — Medication SCH TAB: at 10:44

## 2017-10-06 RX ADMIN — GABAPENTIN SCH MG: 300 CAPSULE ORAL at 21:44

## 2017-10-06 RX ADMIN — METOPROLOL TARTRATE SCH MG: 50 TABLET, FILM COATED ORAL at 21:44

## 2017-10-06 RX ADMIN — Medication SCH MG: at 21:44

## 2017-10-06 RX ADMIN — METOPROLOL TARTRATE SCH MG: 50 TABLET, FILM COATED ORAL at 10:45

## 2017-10-06 RX ADMIN — FLUOCINONIDE SCH APPLIC: 0.5 OINTMENT TOPICAL at 13:28

## 2017-10-06 RX ADMIN — QUETIAPINE FUMARATE SCH MG: 25 TABLET ORAL at 10:44

## 2017-10-07 RX ADMIN — GABAPENTIN SCH MG: 300 CAPSULE ORAL at 13:08

## 2017-10-07 RX ADMIN — METOPROLOL TARTRATE SCH MG: 50 TABLET, FILM COATED ORAL at 10:03

## 2017-10-07 RX ADMIN — METOPROLOL TARTRATE SCH MG: 50 TABLET, FILM COATED ORAL at 21:46

## 2017-10-07 RX ADMIN — AMITRIPTYLINE HYDROCHLORIDE SCH MG: 25 TABLET, FILM COATED ORAL at 21:46

## 2017-10-07 RX ADMIN — AMITRIPTYLINE HYDROCHLORIDE SCH MG: 25 TABLET, FILM COATED ORAL at 06:43

## 2017-10-07 RX ADMIN — FLUOCINONIDE SCH: 0.5 OINTMENT TOPICAL at 06:43

## 2017-10-07 RX ADMIN — ASPIRIN SCH MG: 81 TABLET, COATED ORAL at 10:02

## 2017-10-07 RX ADMIN — QUETIAPINE FUMARATE SCH MG: 25 TABLET ORAL at 13:08

## 2017-10-07 RX ADMIN — ESCITALOPRAM OXALATE SCH MG: 10 TABLET, FILM COATED ORAL at 10:03

## 2017-10-07 RX ADMIN — NICOTINE SCH: 21 PATCH TRANSDERMAL at 10:03

## 2017-10-07 RX ADMIN — QUETIAPINE FUMARATE SCH MG: 25 TABLET ORAL at 10:03

## 2017-10-07 RX ADMIN — LIDOCAINE SCH PATCH: 50 PATCH TOPICAL at 10:02

## 2017-10-07 RX ADMIN — GABAPENTIN SCH MG: 300 CAPSULE ORAL at 06:43

## 2017-10-07 RX ADMIN — Medication SCH EACH: at 21:46

## 2017-10-07 RX ADMIN — PANTOPRAZOLE SODIUM SCH MG: 20 TABLET, DELAYED RELEASE ORAL at 10:03

## 2017-10-07 RX ADMIN — AMLODIPINE BESYLATE SCH MG: 10 TABLET ORAL at 10:03

## 2017-10-07 RX ADMIN — Medication SCH TAB: at 10:03

## 2017-10-07 RX ADMIN — GABAPENTIN SCH MG: 300 CAPSULE ORAL at 21:45

## 2017-10-07 RX ADMIN — QUETIAPINE FUMARATE SCH MG: 25 TABLET ORAL at 17:23

## 2017-10-07 RX ADMIN — Medication SCH MG: at 21:46

## 2017-10-07 RX ADMIN — FLUOCINONIDE SCH: 0.5 OINTMENT TOPICAL at 13:08

## 2017-10-07 RX ADMIN — AMITRIPTYLINE HYDROCHLORIDE SCH MG: 25 TABLET, FILM COATED ORAL at 13:08

## 2017-10-07 RX ADMIN — FLUOCINONIDE SCH: 0.5 OINTMENT TOPICAL at 21:46

## 2017-10-07 RX ADMIN — QUETIAPINE FUMARATE SCH MG: 100 TABLET ORAL at 21:45

## 2017-10-08 RX ADMIN — AMLODIPINE BESYLATE SCH MG: 10 TABLET ORAL at 10:27

## 2017-10-08 RX ADMIN — Medication SCH MG: at 21:39

## 2017-10-08 RX ADMIN — Medication SCH TAB: at 10:27

## 2017-10-08 RX ADMIN — AMITRIPTYLINE HYDROCHLORIDE SCH MG: 25 TABLET, FILM COATED ORAL at 06:47

## 2017-10-08 RX ADMIN — AMITRIPTYLINE HYDROCHLORIDE SCH MG: 25 TABLET, FILM COATED ORAL at 14:02

## 2017-10-08 RX ADMIN — QUETIAPINE FUMARATE SCH MG: 25 TABLET ORAL at 10:27

## 2017-10-08 RX ADMIN — GABAPENTIN SCH MG: 300 CAPSULE ORAL at 06:47

## 2017-10-08 RX ADMIN — AMITRIPTYLINE HYDROCHLORIDE SCH MG: 25 TABLET, FILM COATED ORAL at 21:38

## 2017-10-08 RX ADMIN — QUETIAPINE FUMARATE SCH MG: 25 TABLET ORAL at 14:02

## 2017-10-08 RX ADMIN — FLUOCINONIDE SCH: 0.5 OINTMENT TOPICAL at 21:38

## 2017-10-08 RX ADMIN — FLUOCINONIDE SCH APPLIC: 0.5 OINTMENT TOPICAL at 06:47

## 2017-10-08 RX ADMIN — QUETIAPINE FUMARATE SCH MG: 25 TABLET ORAL at 17:07

## 2017-10-08 RX ADMIN — METOPROLOL TARTRATE SCH MG: 50 TABLET, FILM COATED ORAL at 21:38

## 2017-10-08 RX ADMIN — NICOTINE SCH: 21 PATCH TRANSDERMAL at 10:28

## 2017-10-08 RX ADMIN — Medication SCH EACH: at 21:38

## 2017-10-08 RX ADMIN — GABAPENTIN SCH MG: 300 CAPSULE ORAL at 14:02

## 2017-10-08 RX ADMIN — HYDROXYZINE PAMOATE PRN MG: 50 CAPSULE ORAL at 21:40

## 2017-10-08 RX ADMIN — FLUOCINONIDE SCH: 0.5 OINTMENT TOPICAL at 14:03

## 2017-10-08 RX ADMIN — ESCITALOPRAM OXALATE SCH MG: 10 TABLET, FILM COATED ORAL at 10:27

## 2017-10-08 RX ADMIN — METOPROLOL TARTRATE SCH MG: 50 TABLET, FILM COATED ORAL at 10:27

## 2017-10-08 RX ADMIN — GABAPENTIN SCH MG: 300 CAPSULE ORAL at 21:39

## 2017-10-08 RX ADMIN — PANTOPRAZOLE SODIUM SCH MG: 20 TABLET, DELAYED RELEASE ORAL at 10:27

## 2017-10-08 RX ADMIN — QUETIAPINE FUMARATE SCH MG: 100 TABLET ORAL at 21:39

## 2017-10-08 RX ADMIN — ASPIRIN SCH MG: 81 TABLET, COATED ORAL at 10:27

## 2017-10-08 RX ADMIN — LIDOCAINE SCH PATCH: 50 PATCH TOPICAL at 10:28

## 2017-10-09 LAB
APPEARANCE UR: (no result)
BACTERIA #/AREA URNS HPF: (no result) /HPF
BILIRUB UR STRIP.AUTO-MCNC: NEGATIVE MG/DL
COLOR UR: YELLOW
KETONES UR QL STRIP: NEGATIVE
LEUKOCYTE ESTERASE UR QL STRIP.AUTO: (no result)
NITRITE UR QL STRIP: NEGATIVE
PH UR: 6 [PH] (ref 5–8)
PROT UR QL STRIP: NEGATIVE
PROT UR QL STRIP: NEGATIVE
RBC # UR STRIP: NEGATIVE /UL
SP GR UR: 1.02 (ref 1–1.02)
UROBILINOGEN UR STRIP-MCNC: NEGATIVE MG/DL (ref 0.2–1)
WBC # UR AUTO: (no result) /UL (ref 3–5)

## 2017-10-09 RX ADMIN — QUETIAPINE FUMARATE SCH MG: 25 TABLET ORAL at 17:07

## 2017-10-09 RX ADMIN — MICONAZOLE NITRATE SCH SUPP: 100 SUPPOSITORY VAGINAL at 21:43

## 2017-10-09 RX ADMIN — FLUOCINONIDE SCH: 0.5 OINTMENT TOPICAL at 21:43

## 2017-10-09 RX ADMIN — QUETIAPINE FUMARATE SCH MG: 25 TABLET ORAL at 13:16

## 2017-10-09 RX ADMIN — FLUOCINONIDE SCH: 0.5 OINTMENT TOPICAL at 13:17

## 2017-10-09 RX ADMIN — PANTOPRAZOLE SODIUM SCH MG: 20 TABLET, DELAYED RELEASE ORAL at 10:53

## 2017-10-09 RX ADMIN — LIDOCAINE SCH PATCH: 50 PATCH TOPICAL at 10:53

## 2017-10-09 RX ADMIN — Medication SCH EACH: at 21:43

## 2017-10-09 RX ADMIN — METOPROLOL TARTRATE SCH MG: 50 TABLET, FILM COATED ORAL at 10:53

## 2017-10-09 RX ADMIN — AMITRIPTYLINE HYDROCHLORIDE SCH MG: 25 TABLET, FILM COATED ORAL at 13:16

## 2017-10-09 RX ADMIN — AMITRIPTYLINE HYDROCHLORIDE SCH MG: 25 TABLET, FILM COATED ORAL at 21:42

## 2017-10-09 RX ADMIN — FLUOCINONIDE SCH APPLIC: 0.5 OINTMENT TOPICAL at 06:30

## 2017-10-09 RX ADMIN — Medication SCH MG: at 21:42

## 2017-10-09 RX ADMIN — ESCITALOPRAM OXALATE SCH MG: 10 TABLET, FILM COATED ORAL at 10:53

## 2017-10-09 RX ADMIN — AMITRIPTYLINE HYDROCHLORIDE SCH MG: 25 TABLET, FILM COATED ORAL at 06:30

## 2017-10-09 RX ADMIN — GABAPENTIN SCH MG: 300 CAPSULE ORAL at 13:16

## 2017-10-09 RX ADMIN — GABAPENTIN SCH MG: 300 CAPSULE ORAL at 21:42

## 2017-10-09 RX ADMIN — NICOTINE SCH: 21 PATCH TRANSDERMAL at 10:54

## 2017-10-09 RX ADMIN — AMLODIPINE BESYLATE SCH MG: 10 TABLET ORAL at 10:53

## 2017-10-09 RX ADMIN — Medication SCH TAB: at 10:53

## 2017-10-09 RX ADMIN — QUETIAPINE FUMARATE SCH MG: 25 TABLET ORAL at 10:53

## 2017-10-09 RX ADMIN — QUETIAPINE FUMARATE SCH MG: 100 TABLET ORAL at 21:42

## 2017-10-09 RX ADMIN — ASPIRIN SCH MG: 81 TABLET, COATED ORAL at 10:53

## 2017-10-09 RX ADMIN — GABAPENTIN SCH MG: 300 CAPSULE ORAL at 06:30

## 2017-10-09 RX ADMIN — METOPROLOL TARTRATE SCH MG: 50 TABLET, FILM COATED ORAL at 21:42

## 2017-10-10 RX ADMIN — AMITRIPTYLINE HYDROCHLORIDE SCH: 25 TABLET, FILM COATED ORAL at 21:37

## 2017-10-10 RX ADMIN — IBUPROFEN PRN MG: 400 TABLET, FILM COATED ORAL at 18:32

## 2017-10-10 RX ADMIN — Medication SCH TAB: at 10:45

## 2017-10-10 RX ADMIN — FLUOCINONIDE SCH APPLIC: 0.5 OINTMENT TOPICAL at 13:21

## 2017-10-10 RX ADMIN — GABAPENTIN SCH MG: 300 CAPSULE ORAL at 21:36

## 2017-10-10 RX ADMIN — QUETIAPINE FUMARATE SCH: 25 TABLET ORAL at 13:22

## 2017-10-10 RX ADMIN — ESCITALOPRAM OXALATE SCH MG: 10 TABLET, FILM COATED ORAL at 10:45

## 2017-10-10 RX ADMIN — METOPROLOL TARTRATE SCH MG: 50 TABLET, FILM COATED ORAL at 21:36

## 2017-10-10 RX ADMIN — GABAPENTIN SCH MG: 300 CAPSULE ORAL at 06:32

## 2017-10-10 RX ADMIN — NICOTINE SCH: 21 PATCH TRANSDERMAL at 10:45

## 2017-10-10 RX ADMIN — AMITRIPTYLINE HYDROCHLORIDE SCH MG: 25 TABLET, FILM COATED ORAL at 06:32

## 2017-10-10 RX ADMIN — AMITRIPTYLINE HYDROCHLORIDE SCH: 25 TABLET, FILM COATED ORAL at 13:22

## 2017-10-10 RX ADMIN — FLUOCINONIDE SCH: 0.5 OINTMENT TOPICAL at 21:37

## 2017-10-10 RX ADMIN — Medication SCH MG: at 21:36

## 2017-10-10 RX ADMIN — FLUOCINONIDE SCH APPLIC: 0.5 OINTMENT TOPICAL at 06:33

## 2017-10-10 RX ADMIN — GABAPENTIN SCH MG: 300 CAPSULE ORAL at 13:20

## 2017-10-10 RX ADMIN — QUETIAPINE FUMARATE SCH MG: 100 TABLET ORAL at 21:36

## 2017-10-10 RX ADMIN — ASPIRIN SCH MG: 81 TABLET, COATED ORAL at 10:45

## 2017-10-10 RX ADMIN — LIDOCAINE SCH PATCH: 50 PATCH TOPICAL at 10:45

## 2017-10-10 RX ADMIN — Medication SCH EACH: at 21:37

## 2017-10-10 RX ADMIN — METOPROLOL TARTRATE SCH MG: 50 TABLET, FILM COATED ORAL at 10:45

## 2017-10-10 RX ADMIN — QUETIAPINE FUMARATE SCH: 25 TABLET ORAL at 10:45

## 2017-10-10 RX ADMIN — PANTOPRAZOLE SODIUM SCH MG: 20 TABLET, DELAYED RELEASE ORAL at 10:45

## 2017-10-10 RX ADMIN — AMLODIPINE BESYLATE SCH MG: 10 TABLET ORAL at 10:45

## 2017-10-10 RX ADMIN — MICONAZOLE NITRATE SCH SUPP: 100 SUPPOSITORY VAGINAL at 21:37

## 2017-10-10 RX ADMIN — QUETIAPINE FUMARATE SCH MG: 25 TABLET ORAL at 18:32

## 2017-10-11 RX ADMIN — METOPROLOL TARTRATE SCH MG: 50 TABLET, FILM COATED ORAL at 10:57

## 2017-10-11 RX ADMIN — NAPROXEN SCH MG: 500 TABLET ORAL at 21:46

## 2017-10-11 RX ADMIN — ACETAMINOPHEN PRN MG: 325 TABLET ORAL at 17:27

## 2017-10-11 RX ADMIN — ASPIRIN SCH MG: 81 TABLET, COATED ORAL at 10:56

## 2017-10-11 RX ADMIN — AMLODIPINE BESYLATE SCH MG: 10 TABLET ORAL at 10:57

## 2017-10-11 RX ADMIN — AMITRIPTYLINE HYDROCHLORIDE SCH MG: 25 TABLET, FILM COATED ORAL at 06:11

## 2017-10-11 RX ADMIN — QUETIAPINE FUMARATE SCH MG: 25 TABLET ORAL at 10:57

## 2017-10-11 RX ADMIN — AMOXICILLIN SCH MG: 500 CAPSULE ORAL at 21:46

## 2017-10-11 RX ADMIN — FLUOCINONIDE SCH: 0.5 OINTMENT TOPICAL at 06:09

## 2017-10-11 RX ADMIN — Medication SCH TAB: at 10:56

## 2017-10-11 RX ADMIN — METOPROLOL TARTRATE SCH MG: 50 TABLET, FILM COATED ORAL at 21:46

## 2017-10-11 RX ADMIN — FLUOCINONIDE SCH: 0.5 OINTMENT TOPICAL at 13:51

## 2017-10-11 RX ADMIN — Medication SCH MG: at 21:46

## 2017-10-11 RX ADMIN — AMOXICILLIN SCH MG: 500 CAPSULE ORAL at 16:05

## 2017-10-11 RX ADMIN — PANTOPRAZOLE SODIUM SCH MG: 20 TABLET, DELAYED RELEASE ORAL at 10:56

## 2017-10-11 RX ADMIN — GABAPENTIN SCH MG: 300 CAPSULE ORAL at 13:49

## 2017-10-11 RX ADMIN — IBUPROFEN PRN MG: 400 TABLET, FILM COATED ORAL at 06:10

## 2017-10-11 RX ADMIN — AMITRIPTYLINE HYDROCHLORIDE SCH MG: 25 TABLET, FILM COATED ORAL at 13:49

## 2017-10-11 RX ADMIN — QUETIAPINE FUMARATE SCH MG: 25 TABLET ORAL at 13:49

## 2017-10-11 RX ADMIN — AMITRIPTYLINE HYDROCHLORIDE SCH: 25 TABLET, FILM COATED ORAL at 21:47

## 2017-10-11 RX ADMIN — GABAPENTIN SCH MG: 300 CAPSULE ORAL at 21:46

## 2017-10-11 RX ADMIN — ACETAMINOPHEN PRN MG: 325 TABLET ORAL at 08:56

## 2017-10-11 RX ADMIN — QUETIAPINE FUMARATE SCH MG: 25 TABLET ORAL at 17:27

## 2017-10-11 RX ADMIN — NICOTINE SCH: 21 PATCH TRANSDERMAL at 10:58

## 2017-10-11 RX ADMIN — Medication SCH EACH: at 21:47

## 2017-10-11 RX ADMIN — FLUOCINONIDE SCH: 0.5 OINTMENT TOPICAL at 21:47

## 2017-10-11 RX ADMIN — ESCITALOPRAM OXALATE SCH MG: 10 TABLET, FILM COATED ORAL at 10:57

## 2017-10-11 RX ADMIN — LIDOCAINE SCH PATCH: 50 PATCH TOPICAL at 10:57

## 2017-10-11 RX ADMIN — MICONAZOLE NITRATE SCH SUPP: 100 SUPPOSITORY VAGINAL at 21:47

## 2017-10-11 RX ADMIN — GABAPENTIN SCH MG: 300 CAPSULE ORAL at 06:09

## 2017-10-11 NOTE — PN
[facility] Progress Note


Note: 


dental pain , reporting abscess from missing tooth, started on amoxicillin, 

viscous lidocain and naprosyn atc with protonix advised to follow up with 

dentist when discharged, d/c anitbiotics when discharged.

## 2017-10-11 NOTE — PN
Psychiatric Progress Note


Vital Signs: 


 Vital Signs











 Period  Temp  Pulse  Resp  BP Sys/[place]inas  Pulse Ox


 


 Last 24 Hr  98.1 F  76-77  16-18  146-151/94-95  











Date of Session: 10/11/17


Chief Complaint:: Bryant not sleeping well ,it takes time to fall asleep."


HPI: Patient addressed Opioid,Alcohol and Cocaine dependence comorbid with 

Bipolar disorder,PTSD.


ROS: Significant for  HTN,Gastritis.


Current Medications: 


Active Medications











Generic Name Dose Route Start Last Admin





  Trade Name Freq  PRN Reason Stop Dose Admin


 


Acetaminophen  650 mg 09/26/17 16:50 10/11/17 08:56





  Tylenol -  PO   650 mg





  Q4H PRN   Administration





  PAIN   


 


Al Hydroxide/Mg Hydroxide  30 ml 09/26/17 16:50  





  Mylanta Oral Suspension -  PO   





  Q6H PRN   





  DYSPEPSIA   


 


Amitriptyline HCl  25 mg 09/27/17 14:00 10/11/17 06:11





  Elavil -  PO   25 mg





  TID ONUR   Administration


 


Amlodipine Besylate  10 mg 09/28/17 15:45 10/11/17 10:57





  Norvasc -  PO   10 mg





  DAILY ONUR   Administration


 


Aspirin  81 mg 09/27/17 10:00 10/11/17 10:56





  Ecotrin -  PO   81 mg





  DAILY ONUR   Administration


 


Clonidine  0.1 mg 09/27/17 10:00 10/11/17 10:56





  Catapres -  PO   0.1 mg





  DAILY ONUR   Administration


 


Clonidine  0.1 mg 10/01/17 07:04 10/11/17 06:09





  Catapres -  PO   0.1 mg





  BID PRN   Administration





  HYPERTENSION   


 


Diphenhydramine HCl  50 mg 09/26/17 16:50 09/26/17 22:24





  Benadryl -  PO   50 mg





  HSMR1 PRN   Administration





  INSOMNIA   


 


Escitalopram Oxalate  10 mg 09/27/17 12:00 10/11/17 10:57





  Lexapro -  PO   10 mg





  DAILY ONUR   Administration


 


Eucalyptus/Menthol/Phenol/Sorbitol  1 each 09/26/17 16:50  





  Cepastat Lozenge -  MM   





  Q4H PRN   





  SORE THROAT   


 


Fluocinonide  1 applic 09/26/17 22:00 10/11/17 06:09





  Lidex 0.05% Ointment -  TP   Not Given





  TID ONUR   


 


Gabapentin  300 mg 09/29/17 14:00 10/11/17 06:09





  Neurontin -  PO   300 mg





  TID ONUR   Administration


 


Guaifenesin  10 ml 09/26/17 16:50  





  Robitussin Dm -  PO   





  Q6H PRN   





  COUGH   


 


Hydroxyzine Pamoate  50 mg 09/26/17 16:50 10/08/17 21:40





  Vistaril -  PO   50 mg





  Q4H PRN   Administration





  AGITATION   


 


Ibuprofen  400 mg 09/26/17 16:50 10/11/17 06:10





  Motrin -  PO   400 mg





  Q6H PRN   Administration





  SEVERE PAIN   


 


Lidocaine  1 patch 09/27/17 10:00 10/11/17 10:57





  Lidoderm Patch -  TP   1 patch





  DAILY ONUR   Administration


 


Loperamide HCl  4 mg 09/26/17 16:50  





  Imodium -  PO   





  Q6H PRN   





  DIARRHEA   


 


Magnesium Citrate  300 ml 09/26/17 16:50  





  Citroma -  PO   





  Q48H PRN   





  CONSTIPATION   


 


Magnesium Hydroxide  30 ml 09/26/17 16:50  





  Milk Of Magnesia -  PO   





  DAILY PRN   





  CONSTIPATION   


 


Metoprolol Tartrate  50 mg 09/26/17 22:00 10/11/17 10:57





  Lopressor -  PO   50 mg





  BID ONUR   Administration


 


Miconazole Nitrate  100 mg 10/09/17 22:00 10/10/17 21:37





  Monistat-7 Vaginal Suppository -  PV   1 supp





  HS ONUR   Administration


 


Miscellaneous  1 each 09/27/17 22:00 10/10/17 21:37





  Lidoderm Patch Removal  MC   1 each





  DAILY@2200 ONUR   Administration


 


Nicotine  21 mg 09/27/17 10:00 10/11/17 10:58





  Nicoderm Patch -  TD   Not Given





  DAILY ONUR   


 


Nicotine Polacrilex  4 mg 09/26/17 16:50 09/27/17 09:55





  Nicorette Gum -  BC   4 mg





  Q2H PRN   Administration





  NICOTINE REPLACEMENT RX   


 


Pantoprazole Sodium  20 mg 09/27/17 10:00 10/11/17 10:56





  Protonix -  PO   20 mg





  DAILY ONUR   Administration


 


Prenatal Multivit/Folic Acid/Iron  1 tab 09/27/17 10:00 10/11/17 10:56





  Prenatal Vitamins (Sjr) -  PO   1 tab





  DAILY ONUR   Administration


 


Pseudoephedrine/Triprolidine  1 combo 09/26/17 16:50 10/09/17 15:29





  Actifed -  PO   1 combo





  TID PRN   Administration





  NASAL CONGESTION   


 


Quetiapine Fumarate  25 mg 09/29/17 14:00 10/11/17 10:57





  Seroquel -  PO   25 mg





  TID@1000,1400,1800 OUNR   Administration


 


Quetiapine Fumarate  150 mg 10/11/17 22:00  





  Seroquel -  PO   





  HS ONUR   


 


Thiamine HCl  100 mg 09/26/17 22:00 10/10/17 21:36





  Vitamin B1 -  PO   100 mg





  HS ONUR   Administration











Current Side Effect: No


Lab tests ordered: No


Lab tests reviewed: Yes


Provider note:: Chart was revuewed,patient was seen,treatment plan and 

medication management has been discussed with the patient.properties of 

Seroquel has been discussed with the patient including side effects,benefits 

and dose adjustment. Seroquel 100 mg po hs will be adjusted to 150 mg po 

hs.Continue ElAVIL 25 MG PO TID,Lexapro 10 mg po daily and Neurontin 300 mg po 

tid.  Supportive therapy provided focusing on relaxation techniques.


Total face to face time:: 30





Mental Status Exam





- Mental Status Exam


Alert and Oriented to: Time, Place, Person


Cognitive Function: Grossly Intact


Patient Appearance: Unkempt


Mood: Sad, Irritable


Affect: Mood Congruent, Labile


Patient Behavior: Cooperative


Speech Pattern: Clear


Voice Loudness: Normal


Thought Process: Goal Oriented


Thought Disorder: Not Present


Hallucinations: Denies


Suicidal Ideation: Denies


Homicidal Ideation: Denies


Insight/Judgement: Fair


Sleep: Difficulty falling asleep


Appetite: Fair


Muscle strength/Tone: Normal


Gait/Station: Normal





Psychiatric Treatment Plan





- Problem List


(1) Cocaine dependence


Current Visit: Yes   Qualifiers: 


     Substance use status: uncomplicated        Qualified Code(s): F14.20 - 

Cocaine dependence, uncomplicated; F14.20 - Cocaine dependence, uncomplicated; 

F14.20 - Cocaine dependence, uncomplicated  





(2) Gastritis


Current Visit: Yes   Qualifiers: 


     Chronicity: unspecified     Gastritis bleeding: without bleeding





(3) Hypertension


Current Visit: Yes   Qualifiers: 


     Hypertension type: essential hypertension        Qualified Code(s): I10 - 

Essential (primary) hypertension; I10 - Essential (primary) hypertension; I10 - 

Essential (primary) hypertension  





(4) Nicotine dependence


Current Visit: Yes   Qualifiers: 


     Nicotine product type: cigarettes     Substance use status: uncomplicated 

       Qualified Code(s): F17.210 - Nicotine dependence, cigarettes, 

uncomplicated; F17.210 - Nicotine dependence, cigarettes, uncomplicated  





(5) PTSD (post-traumatic stress disorder)


Current Visit: Yes





(6) Bipolar disorder


Current Visit: Yes   Qualifiers: 


     Current episode severity: unspecified





(7) Heroin dependence


Current Visit: Yes





(8) Alcohol dependence


Current Visit: Yes

## 2017-10-12 RX ADMIN — GABAPENTIN SCH MG: 300 CAPSULE ORAL at 21:47

## 2017-10-12 RX ADMIN — Medication SCH MG: at 21:48

## 2017-10-12 RX ADMIN — AMITRIPTYLINE HYDROCHLORIDE SCH MG: 25 TABLET, FILM COATED ORAL at 06:50

## 2017-10-12 RX ADMIN — NICOTINE SCH: 21 PATCH TRANSDERMAL at 10:32

## 2017-10-12 RX ADMIN — NAPROXEN SCH MG: 500 TABLET ORAL at 10:31

## 2017-10-12 RX ADMIN — Medication SCH TAB: at 10:32

## 2017-10-12 RX ADMIN — ASPIRIN SCH MG: 81 TABLET, COATED ORAL at 10:31

## 2017-10-12 RX ADMIN — GABAPENTIN SCH MG: 300 CAPSULE ORAL at 06:50

## 2017-10-12 RX ADMIN — METOPROLOL TARTRATE SCH MG: 50 TABLET, FILM COATED ORAL at 21:47

## 2017-10-12 RX ADMIN — Medication SCH EACH: at 21:48

## 2017-10-12 RX ADMIN — AMOXICILLIN SCH MG: 500 CAPSULE ORAL at 21:47

## 2017-10-12 RX ADMIN — QUETIAPINE FUMARATE SCH MG: 25 TABLET ORAL at 17:14

## 2017-10-12 RX ADMIN — AMITRIPTYLINE HYDROCHLORIDE SCH: 25 TABLET, FILM COATED ORAL at 13:18

## 2017-10-12 RX ADMIN — MICONAZOLE NITRATE SCH: 100 SUPPOSITORY VAGINAL at 21:57

## 2017-10-12 RX ADMIN — FLUOCINONIDE SCH APPLIC: 0.5 OINTMENT TOPICAL at 06:51

## 2017-10-12 RX ADMIN — AMOXICILLIN SCH MG: 500 CAPSULE ORAL at 13:18

## 2017-10-12 RX ADMIN — FLUOCINONIDE SCH: 0.5 OINTMENT TOPICAL at 21:48

## 2017-10-12 RX ADMIN — GABAPENTIN SCH MG: 300 CAPSULE ORAL at 13:18

## 2017-10-12 RX ADMIN — FLUOCINONIDE SCH: 0.5 OINTMENT TOPICAL at 13:18

## 2017-10-12 RX ADMIN — QUETIAPINE FUMARATE SCH MG: 25 TABLET ORAL at 13:19

## 2017-10-12 RX ADMIN — AMITRIPTYLINE HYDROCHLORIDE SCH: 25 TABLET, FILM COATED ORAL at 21:48

## 2017-10-12 RX ADMIN — QUETIAPINE FUMARATE SCH MG: 25 TABLET ORAL at 10:31

## 2017-10-12 RX ADMIN — AMLODIPINE BESYLATE SCH MG: 10 TABLET ORAL at 10:31

## 2017-10-12 RX ADMIN — ESCITALOPRAM OXALATE SCH MG: 10 TABLET, FILM COATED ORAL at 10:32

## 2017-10-12 RX ADMIN — NAPROXEN SCH MG: 500 TABLET ORAL at 21:47

## 2017-10-12 RX ADMIN — PANTOPRAZOLE SODIUM SCH MG: 40 TABLET, DELAYED RELEASE ORAL at 17:14

## 2017-10-12 RX ADMIN — LIDOCAINE SCH PATCH: 50 PATCH TOPICAL at 10:31

## 2017-10-12 RX ADMIN — METOPROLOL TARTRATE SCH MG: 50 TABLET, FILM COATED ORAL at 10:31

## 2017-10-12 RX ADMIN — AMOXICILLIN SCH MG: 500 CAPSULE ORAL at 06:50

## 2017-10-13 RX ADMIN — Medication SCH TAB: at 09:44

## 2017-10-13 RX ADMIN — FLUOCINONIDE SCH APPLIC: 0.5 OINTMENT TOPICAL at 06:06

## 2017-10-13 RX ADMIN — AMOXICILLIN SCH MG: 500 CAPSULE ORAL at 13:05

## 2017-10-13 RX ADMIN — METOPROLOL TARTRATE SCH MG: 50 TABLET, FILM COATED ORAL at 09:44

## 2017-10-13 RX ADMIN — AMOXICILLIN SCH MG: 500 CAPSULE ORAL at 21:55

## 2017-10-13 RX ADMIN — AMITRIPTYLINE HYDROCHLORIDE SCH MG: 25 TABLET, FILM COATED ORAL at 06:05

## 2017-10-13 RX ADMIN — Medication SCH MG: at 21:56

## 2017-10-13 RX ADMIN — QUETIAPINE FUMARATE SCH: 25 TABLET ORAL at 13:06

## 2017-10-13 RX ADMIN — METOPROLOL TARTRATE SCH MG: 50 TABLET, FILM COATED ORAL at 21:56

## 2017-10-13 RX ADMIN — MICONAZOLE NITRATE SCH: 100 SUPPOSITORY VAGINAL at 22:10

## 2017-10-13 RX ADMIN — GABAPENTIN SCH MG: 300 CAPSULE ORAL at 06:05

## 2017-10-13 RX ADMIN — NAPROXEN SCH MG: 500 TABLET ORAL at 21:56

## 2017-10-13 RX ADMIN — GABAPENTIN SCH MG: 300 CAPSULE ORAL at 21:55

## 2017-10-13 RX ADMIN — ESCITALOPRAM OXALATE SCH MG: 10 TABLET, FILM COATED ORAL at 09:44

## 2017-10-13 RX ADMIN — ASPIRIN SCH MG: 81 TABLET, COATED ORAL at 09:44

## 2017-10-13 RX ADMIN — LIDOCAINE SCH PATCH: 50 PATCH TOPICAL at 09:45

## 2017-10-13 RX ADMIN — NAPROXEN SCH MG: 500 TABLET ORAL at 09:45

## 2017-10-13 RX ADMIN — PANTOPRAZOLE SODIUM SCH MG: 40 TABLET, DELAYED RELEASE ORAL at 09:45

## 2017-10-13 RX ADMIN — GABAPENTIN SCH MG: 300 CAPSULE ORAL at 13:05

## 2017-10-13 RX ADMIN — AMITRIPTYLINE HYDROCHLORIDE SCH: 25 TABLET, FILM COATED ORAL at 13:06

## 2017-10-13 RX ADMIN — FLUOCINONIDE SCH: 0.5 OINTMENT TOPICAL at 13:06

## 2017-10-13 RX ADMIN — QUETIAPINE FUMARATE SCH MG: 25 TABLET ORAL at 09:45

## 2017-10-13 RX ADMIN — AMLODIPINE BESYLATE SCH MG: 10 TABLET ORAL at 09:44

## 2017-10-13 RX ADMIN — QUETIAPINE FUMARATE SCH MG: 25 TABLET ORAL at 17:48

## 2017-10-13 RX ADMIN — NICOTINE SCH: 21 PATCH TRANSDERMAL at 09:47

## 2017-10-13 RX ADMIN — FLUOCINONIDE SCH APPLIC: 0.5 OINTMENT TOPICAL at 21:55

## 2017-10-13 RX ADMIN — AMOXICILLIN SCH MG: 500 CAPSULE ORAL at 06:05

## 2017-10-13 RX ADMIN — Medication SCH EACH: at 22:09

## 2017-10-13 NOTE — PN
Psychiatric Progress Note


Vital Signs: 


 Vital Signs











 Period  Temp  Pulse  Resp  BP Sys/[place]  Pulse Ox


 


 Last 24 Hr  98.2 F  75-86  16-17  131-156/85-98  











Date of Session: 10/13/17


Chief Complaint:: Bryant not sleeping well,Bryant still anxious."


HPI: Cocaine,Opioid and Alcohol dependence comorbid with Bipolar disorder,PTSD.


ROS: Significant for HTN,Gastritis.


Current Medications: 


Active Medications











Generic Name Dose Route Start Last Admin





  Trade Name Freq  PRN Reason Stop Dose Admin


 


Acetaminophen  650 mg 09/26/17 16:50 10/11/17 17:27





  Tylenol -  PO   650 mg





  Q4H PRN   Administration





  PAIN   


 


Al Hydroxide/Mg Hydroxide  30 ml 09/26/17 16:50  





  Mylanta Oral Suspension -  PO   





  Q6H PRN   





  DYSPEPSIA   


 


Amlodipine Besylate  10 mg 09/28/17 15:45 10/13/17 09:44





  Norvasc -  PO   10 mg





  DAILY ONUR   Administration


 


Amoxicillin  500 mg 10/11/17 15:39 10/13/17 13:05





  Amoxicillin -  PO   500 mg





  TID ONUR   Administration


 


Aspirin  81 mg 09/27/17 10:00 10/13/17 09:44





  Ecotrin -  PO   81 mg





  DAILY ONUR   Administration


 


Clonidine  0.1 mg 09/27/17 10:00 10/13/17 09:45





  Catapres -  PO   0.1 mg





  DAILY ONUR   Administration


 


Clonidine  0.1 mg 10/01/17 07:04 10/12/17 06:50





  Catapres -  PO   0.1 mg





  BID PRN   Administration





  HYPERTENSION   


 


Diphenhydramine HCl  50 mg 09/26/17 16:50 09/26/17 22:24





  Benadryl -  PO   50 mg





  HSMR1 PRN   Administration





  INSOMNIA   


 


Escitalopram Oxalate  10 mg 09/27/17 12:00 10/13/17 09:44





  Lexapro -  PO   10 mg





  DAILY ONUR   Administration


 


Eucalyptus/Menthol/Phenol/Sorbitol  1 each 09/26/17 16:50  





  Cepastat Lozenge -  MM   





  Q4H PRN   





  SORE THROAT   


 


Fluocinonide  1 applic 09/26/17 22:00 10/13/17 13:06





  Lidex 0.05% Ointment -  TP   Not Given





  TID ONUR   


 


Gabapentin  300 mg 09/29/17 14:00 10/13/17 13:05





  Neurontin -  PO   300 mg





  TID ONUR   Administration


 


Guaifenesin  10 ml 09/26/17 16:50  





  Robitussin Dm -  PO   





  Q6H PRN   





  COUGH   


 


Hydroxyzine Pamoate  50 mg 09/26/17 16:50 10/08/17 21:40





  Vistaril -  PO   50 mg





  Q4H PRN   Administration





  AGITATION   


 


Lidocaine  1 patch 09/27/17 10:00 10/13/17 09:45





  Lidoderm Patch -  TP   1 patch





  DAILY ONUR   Administration


 


Lidocaine HCl  20 ml 10/11/17 14:50 10/11/17 16:05





  Xylocaine 2% Viscous Oral -  MM   20 ml





  Q6HPO PRN   Administration





  ORAL PAIN/MOUTH SORES   


 


Loperamide HCl  4 mg 09/26/17 16:50  





  Imodium -  PO   





  Q6H PRN   





  DIARRHEA   


 


Magnesium Citrate  300 ml 09/26/17 16:50  





  Citroma -  PO   





  Q48H PRN   





  CONSTIPATION   


 


Magnesium Hydroxide  30 ml 09/26/17 16:50  





  Milk Of Magnesia -  PO   





  DAILY PRN   





  CONSTIPATION   


 


Metoprolol Tartrate  50 mg 09/26/17 22:00 10/13/17 09:44





  Lopressor -  PO   50 mg





  BID ONUR   Administration


 


Miconazole Nitrate  100 mg 10/09/17 22:00 10/11/17 21:47





  Monistat-7 Vaginal Suppository -  PV   1 supp





  HS ONUR   Administration


 


Miscellaneous  1 each 09/27/17 22:00 10/12/17 21:48





  Lidoderm Patch Removal  MC   1 each





  DAILY@2200 ONUR   Administration


 


Naproxen  500 mg 10/11/17 22:00 10/13/17 09:45





  Naprosyn -  PO   500 mg





  BID ONUR   Administration


 


Nicotine  21 mg 09/27/17 10:00 10/13/17 09:47





  Nicoderm Patch -  TD   Not Given





  DAILY ONUR   


 


Nicotine Polacrilex  4 mg 09/26/17 16:50 09/27/17 09:55





  Nicorette Gum -  BC   4 mg





  Q2H PRN   Administration





  NICOTINE REPLACEMENT RX   


 


Pantoprazole Sodium  40 mg 10/12/17 15:39 10/13/17 09:45





  Protonix -  PO   40 mg





  DAILY ONUR   Administration


 


Prenatal Multivit/Folic Acid/Iron  1 tab 09/27/17 10:00 10/13/17 09:44





  Prenatal Vitamins (Sjr) -  PO   1 tab





  DAILY ONUR   Administration


 


Pseudoephedrine/Triprolidine  1 combo 09/26/17 16:50 10/09/17 15:29





  Actifed -  PO   1 combo





  TID PRN   Administration





  NASAL CONGESTION   


 


Quetiapine Fumarate  25 mg 09/29/17 14:00 10/13/17 13:06





  Seroquel -  PO   Not Given





  TID@1000,1400,1800 ONUR   


 


Quetiapine Fumarate  200 mg 10/13/17 22:00  





  Seroquel -  PO   





  HS ONUR   


 


Thiamine HCl  100 mg 09/26/17 22:00 10/12/17 21:48





  Vitamin B1 -  PO   100 mg





  HS ONUR   Administration











Current Side Effect: No


Lab tests ordered: No


Lab tests reviewed: Yes


Provider note:: Chart was revuewed,patient was evaluated,treatment plan 

including medication management has been discussed with the patient.Patient 

addressed sleeping difficulties as well as anxiety.She also reports that Elvil 

25 mg po tid was not effective for anxiety. Seroquel 150 mg po hs will be 

adjusted to 200 mg po hs.D/C Elavil 25 mg po tid.Supportive therapy has been 

provided.


Total face to face time:: 30





Mental Status Exam





- Mental Status Exam


Alert and Oriented to: Time, Place, Person


Cognitive Function: Grossly Intact


Patient Appearance: Unkempt


Mood: Sad, Nervous, Anxious, Expansive


Affect: Mood Congruent, Labile


Patient Behavior: Cooperative


Speech Pattern: Clear


Voice Loudness: Normal


Thought Process: Goal Oriented


Thought Disorder: Not Present


Hallucinations: Denies


Suicidal Ideation: Denies


Homicidal Ideation: Denies


Insight/Judgement: Fair


Sleep: Difficulty falling asleep


Appetite: Good


Muscle strength/Tone: Normal


Gait/Station: Normal





Psychiatric Treatment Plan





- Problem List


(1) Cocaine dependence


Current Visit: Yes   Qualifiers: 


     Substance use status: uncomplicated        Qualified Code(s): F14.20 - 

Cocaine dependence, uncomplicated; F14.20 - Cocaine dependence, uncomplicated; 

F14.20 - Cocaine dependence, uncomplicated  





(2) Gastritis


Current Visit: Yes   Qualifiers: 


     Chronicity: unspecified     Gastritis bleeding: without bleeding





(3) Hypertension


Current Visit: Yes   Qualifiers: 


     Hypertension type: essential hypertension        Qualified Code(s): I10 - 

Essential (primary) hypertension; I10 - Essential (primary) hypertension; I10 - 

Essential (primary) hypertension  





(4) Nicotine dependence


Current Visit: Yes   Qualifiers: 


     Nicotine product type: cigarettes     Substance use status: uncomplicated 

       Qualified Code(s): F17.210 - Nicotine dependence, cigarettes, 

uncomplicated; F17.210 - Nicotine dependence, cigarettes, uncomplicated  





(5) PTSD (post-traumatic stress disorder)


Current Visit: Yes





(6) Bipolar disorder


Current Visit: Yes   Qualifiers: 


     Current episode severity: unspecified





(7) Heroin dependence


Current Visit: Yes





(8) Alcohol dependence


Current Visit: Yes

## 2017-10-14 RX ADMIN — GABAPENTIN SCH MG: 300 CAPSULE ORAL at 13:45

## 2017-10-14 RX ADMIN — AMOXICILLIN SCH MG: 500 CAPSULE ORAL at 13:45

## 2017-10-14 RX ADMIN — AMOXICILLIN SCH MG: 500 CAPSULE ORAL at 06:37

## 2017-10-14 RX ADMIN — METOPROLOL TARTRATE SCH MG: 50 TABLET, FILM COATED ORAL at 10:31

## 2017-10-14 RX ADMIN — QUETIAPINE FUMARATE SCH MG: 25 TABLET ORAL at 10:31

## 2017-10-14 RX ADMIN — GABAPENTIN SCH MG: 300 CAPSULE ORAL at 21:47

## 2017-10-14 RX ADMIN — GABAPENTIN SCH MG: 300 CAPSULE ORAL at 06:37

## 2017-10-14 RX ADMIN — PANTOPRAZOLE SODIUM SCH MG: 40 TABLET, DELAYED RELEASE ORAL at 10:31

## 2017-10-14 RX ADMIN — Medication SCH MG: at 21:47

## 2017-10-14 RX ADMIN — NICOTINE SCH: 21 PATCH TRANSDERMAL at 10:32

## 2017-10-14 RX ADMIN — QUETIAPINE FUMARATE SCH: 25 TABLET ORAL at 13:45

## 2017-10-14 RX ADMIN — FLUOCINONIDE SCH: 0.5 OINTMENT TOPICAL at 21:48

## 2017-10-14 RX ADMIN — ESCITALOPRAM OXALATE SCH MG: 10 TABLET, FILM COATED ORAL at 10:32

## 2017-10-14 RX ADMIN — QUETIAPINE FUMARATE SCH MG: 25 TABLET ORAL at 18:25

## 2017-10-14 RX ADMIN — METOPROLOL TARTRATE SCH MG: 50 TABLET, FILM COATED ORAL at 21:47

## 2017-10-14 RX ADMIN — NAPROXEN SCH MG: 500 TABLET ORAL at 21:47

## 2017-10-14 RX ADMIN — FLUOCINONIDE SCH: 0.5 OINTMENT TOPICAL at 06:38

## 2017-10-14 RX ADMIN — Medication SCH TAB: at 10:32

## 2017-10-14 RX ADMIN — FLUOCINONIDE SCH: 0.5 OINTMENT TOPICAL at 13:45

## 2017-10-14 RX ADMIN — NAPROXEN SCH MG: 500 TABLET ORAL at 10:32

## 2017-10-14 RX ADMIN — LIDOCAINE SCH PATCH: 50 PATCH TOPICAL at 10:30

## 2017-10-14 RX ADMIN — AMLODIPINE BESYLATE SCH MG: 10 TABLET ORAL at 10:31

## 2017-10-14 RX ADMIN — MICONAZOLE NITRATE SCH: 100 SUPPOSITORY VAGINAL at 21:48

## 2017-10-14 RX ADMIN — ASPIRIN SCH MG: 81 TABLET, COATED ORAL at 10:32

## 2017-10-14 RX ADMIN — AMOXICILLIN SCH MG: 500 CAPSULE ORAL at 21:47

## 2017-10-14 RX ADMIN — Medication SCH EACH: at 21:48

## 2017-10-15 RX ADMIN — AMLODIPINE BESYLATE SCH MG: 10 TABLET ORAL at 10:04

## 2017-10-15 RX ADMIN — FLUOCINONIDE SCH: 0.5 OINTMENT TOPICAL at 13:16

## 2017-10-15 RX ADMIN — ESCITALOPRAM OXALATE SCH MG: 10 TABLET, FILM COATED ORAL at 10:04

## 2017-10-15 RX ADMIN — NAPROXEN SCH MG: 500 TABLET ORAL at 21:38

## 2017-10-15 RX ADMIN — NAPROXEN SCH MG: 500 TABLET ORAL at 10:03

## 2017-10-15 RX ADMIN — ACETAMINOPHEN PRN MG: 325 TABLET ORAL at 08:49

## 2017-10-15 RX ADMIN — GABAPENTIN SCH MG: 300 CAPSULE ORAL at 06:34

## 2017-10-15 RX ADMIN — QUETIAPINE FUMARATE SCH: 25 TABLET ORAL at 13:16

## 2017-10-15 RX ADMIN — QUETIAPINE FUMARATE SCH: 25 TABLET ORAL at 18:33

## 2017-10-15 RX ADMIN — Medication SCH TAB: at 10:03

## 2017-10-15 RX ADMIN — METOPROLOL TARTRATE SCH MG: 50 TABLET, FILM COATED ORAL at 21:38

## 2017-10-15 RX ADMIN — NICOTINE SCH: 21 PATCH TRANSDERMAL at 10:05

## 2017-10-15 RX ADMIN — AMOXICILLIN SCH MG: 500 CAPSULE ORAL at 06:34

## 2017-10-15 RX ADMIN — Medication SCH MG: at 21:37

## 2017-10-15 RX ADMIN — PANTOPRAZOLE SODIUM SCH MG: 40 TABLET, DELAYED RELEASE ORAL at 10:03

## 2017-10-15 RX ADMIN — GABAPENTIN SCH MG: 300 CAPSULE ORAL at 13:15

## 2017-10-15 RX ADMIN — MICONAZOLE NITRATE SCH: 100 SUPPOSITORY VAGINAL at 21:39

## 2017-10-15 RX ADMIN — METOPROLOL TARTRATE SCH MG: 50 TABLET, FILM COATED ORAL at 10:03

## 2017-10-15 RX ADMIN — QUETIAPINE FUMARATE SCH MG: 25 TABLET ORAL at 10:03

## 2017-10-15 RX ADMIN — LIDOCAINE SCH PATCH: 50 PATCH TOPICAL at 10:04

## 2017-10-15 RX ADMIN — Medication SCH EACH: at 21:39

## 2017-10-15 RX ADMIN — AMOXICILLIN SCH MG: 500 CAPSULE ORAL at 13:15

## 2017-10-15 RX ADMIN — GABAPENTIN SCH MG: 300 CAPSULE ORAL at 21:37

## 2017-10-15 RX ADMIN — FLUOCINONIDE SCH: 0.5 OINTMENT TOPICAL at 06:34

## 2017-10-15 RX ADMIN — AMOXICILLIN SCH MG: 500 CAPSULE ORAL at 21:38

## 2017-10-15 RX ADMIN — ASPIRIN SCH MG: 81 TABLET, COATED ORAL at 10:04

## 2017-10-15 RX ADMIN — FLUOCINONIDE SCH: 0.5 OINTMENT TOPICAL at 21:39

## 2017-10-16 RX ADMIN — NAPROXEN SCH MG: 500 TABLET ORAL at 21:27

## 2017-10-16 RX ADMIN — Medication SCH MG: at 21:27

## 2017-10-16 RX ADMIN — ASPIRIN SCH MG: 81 TABLET, COATED ORAL at 10:12

## 2017-10-16 RX ADMIN — PANTOPRAZOLE SODIUM SCH MG: 40 TABLET, DELAYED RELEASE ORAL at 10:13

## 2017-10-16 RX ADMIN — Medication SCH TAB: at 10:12

## 2017-10-16 RX ADMIN — GABAPENTIN SCH MG: 300 CAPSULE ORAL at 21:27

## 2017-10-16 RX ADMIN — NICOTINE SCH: 21 PATCH TRANSDERMAL at 10:14

## 2017-10-16 RX ADMIN — FLUOCINONIDE SCH: 0.5 OINTMENT TOPICAL at 21:28

## 2017-10-16 RX ADMIN — GABAPENTIN SCH MG: 300 CAPSULE ORAL at 13:37

## 2017-10-16 RX ADMIN — NAPROXEN SCH MG: 500 TABLET ORAL at 10:12

## 2017-10-16 RX ADMIN — METOPROLOL TARTRATE SCH MG: 50 TABLET, FILM COATED ORAL at 10:13

## 2017-10-16 RX ADMIN — QUETIAPINE FUMARATE SCH: 25 TABLET ORAL at 18:30

## 2017-10-16 RX ADMIN — LIDOCAINE SCH PATCH: 50 PATCH TOPICAL at 10:13

## 2017-10-16 RX ADMIN — QUETIAPINE FUMARATE SCH MG: 25 TABLET ORAL at 13:37

## 2017-10-16 RX ADMIN — QUETIAPINE FUMARATE SCH MG: 25 TABLET ORAL at 10:12

## 2017-10-16 RX ADMIN — MICONAZOLE NITRATE SCH: 100 SUPPOSITORY VAGINAL at 21:28

## 2017-10-16 RX ADMIN — METOPROLOL TARTRATE SCH MG: 50 TABLET, FILM COATED ORAL at 21:27

## 2017-10-16 RX ADMIN — AMOXICILLIN SCH MG: 500 CAPSULE ORAL at 06:49

## 2017-10-16 RX ADMIN — Medication SCH EACH: at 21:28

## 2017-10-16 RX ADMIN — GABAPENTIN SCH MG: 300 CAPSULE ORAL at 06:49

## 2017-10-16 RX ADMIN — AMLODIPINE BESYLATE SCH MG: 10 TABLET ORAL at 10:12

## 2017-10-16 RX ADMIN — FLUOCINONIDE SCH: 0.5 OINTMENT TOPICAL at 13:38

## 2017-10-16 RX ADMIN — AMOXICILLIN SCH MG: 500 CAPSULE ORAL at 21:27

## 2017-10-16 RX ADMIN — AMOXICILLIN SCH MG: 500 CAPSULE ORAL at 13:37

## 2017-10-16 RX ADMIN — FLUOCINONIDE SCH: 0.5 OINTMENT TOPICAL at 07:18

## 2017-10-16 RX ADMIN — ESCITALOPRAM OXALATE SCH MG: 10 TABLET, FILM COATED ORAL at 10:13

## 2017-10-16 NOTE — PN
Psychiatric Progress Note


Vital Signs: 


 Vital Signs











 Period  Temp  Pulse  Resp  BP Sys/[place]  Pulse Ox


 


 Last 24 Hr  97.9 F  77-80  18-18  143-155/91-94  











Date of Session: 10/16/17


Chief Complaint:: Discharge visit


HPI: Patient addressed Cocaine,Alcohol and Opioid dependence comorbid Bipolar 

disorder,PTSD.


ROS: Significant for HTN,Chronic gastritis.


Current Medications: 


Active Medications











Generic Name Dose Route Start Last Admin





  Trade Name Freq  PRN Reason Stop Dose Admin


 


Acetaminophen  650 mg 09/26/17 16:50 10/15/17 08:49





  Tylenol -  PO   650 mg





  Q4H PRN   Administration





  PAIN   


 


Al Hydroxide/Mg Hydroxide  30 ml 09/26/17 16:50  





  Mylanta Oral Suspension -  PO   





  Q6H PRN   





  DYSPEPSIA   


 


Amlodipine Besylate  10 mg 09/28/17 15:45 10/16/17 10:12





  Norvasc -  PO   10 mg





  DAILY ONUR   Administration


 


Amoxicillin  500 mg 10/11/17 15:39 10/16/17 13:37





  Amoxicillin -  PO   500 mg





  TID ONUR   Administration


 


Aspirin  81 mg 09/27/17 10:00 10/16/17 10:12





  Ecotrin -  PO   81 mg





  DAILY ONUR   Administration


 


Clonidine  0.1 mg 09/27/17 10:00 10/16/17 10:13





  Catapres -  PO   0.1 mg





  DAILY ONUR   Administration


 


Clonidine  0.1 mg 10/01/17 07:04 10/16/17 07:43





  Catapres -  PO   0.1 mg





  BID PRN   Administration





  HYPERTENSION   


 


Diphenhydramine HCl  50 mg 09/26/17 16:50 09/26/17 22:24





  Benadryl -  PO   50 mg





  HSMR1 PRN   Administration





  INSOMNIA   


 


Escitalopram Oxalate  10 mg 09/27/17 12:00 10/16/17 10:13





  Lexapro -  PO   10 mg





  DAILY ONUR   Administration


 


Eucalyptus/Menthol/Phenol/Sorbitol  1 each 09/26/17 16:50  





  Cepastat Lozenge -  MM   





  Q4H PRN   





  SORE THROAT   


 


Fluocinonide  1 applic 09/26/17 22:00 10/16/17 13:38





  Lidex 0.05% Ointment -  TP   Not Given





  TID ONUR   


 


Gabapentin  300 mg 09/29/17 14:00 10/16/17 13:37





  Neurontin -  PO   300 mg





  TID ONUR   Administration


 


Guaifenesin  10 ml 09/26/17 16:50  





  Robitussin Dm -  PO   





  Q6H PRN   





  COUGH   


 


Hydroxyzine Pamoate  50 mg 09/26/17 16:50 10/08/17 21:40





  Vistaril -  PO   50 mg





  Q4H PRN   Administration





  AGITATION   


 


Lidocaine  1 patch 09/27/17 10:00 10/16/17 10:13





  Lidoderm Patch -  TP   1 patch





  DAILY ONUR   Administration


 


Lidocaine HCl  20 ml 10/11/17 14:50 10/11/17 16:05





  Xylocaine 2% Viscous Oral -  MM   20 ml





  Q6HPO PRN   Administration





  ORAL PAIN/MOUTH SORES   


 


Loperamide HCl  4 mg 09/26/17 16:50  





  Imodium -  PO   





  Q6H PRN   





  DIARRHEA   


 


Magnesium Citrate  300 ml 09/26/17 16:50  





  Citroma -  PO   





  Q48H PRN   





  CONSTIPATION   


 


Magnesium Hydroxide  30 ml 09/26/17 16:50  





  Milk Of Magnesia -  PO   





  DAILY PRN   





  CONSTIPATION   


 


Metoprolol Tartrate  50 mg 09/26/17 22:00 10/16/17 10:13





  Lopressor -  PO   50 mg





  BID ONUR   Administration


 


Miconazole Nitrate  100 mg 10/09/17 22:00 10/15/17 21:39





  Monistat-7 Vaginal Suppository -  PV   Not Given





  HS ONUR   


 


Miscellaneous  1 each 09/27/17 22:00 10/15/17 21:39





  Lidoderm Patch Removal  MC   1 each





  DAILY@2200 ONUR   Administration


 


Naproxen  500 mg 10/11/17 22:00 10/16/17 10:12





  Naprosyn -  PO   500 mg





  BID ONUR   Administration


 


Nicotine  21 mg 09/27/17 10:00 10/16/17 10:14





  Nicoderm Patch -  TD   Not Given





  DAILY ONUR   


 


Nicotine Polacrilex  4 mg 09/26/17 16:50 09/27/17 09:55





  Nicorette Gum -  BC   4 mg





  Q2H PRN   Administration





  NICOTINE REPLACEMENT RX   


 


Pantoprazole Sodium  40 mg 10/12/17 15:39 10/16/17 10:13





  Protonix -  PO   40 mg





  DAILY ONUR   Administration


 


Prenatal Multivit/Folic Acid/Iron  1 tab 09/27/17 10:00 10/16/17 10:12





  Prenatal Vitamins (Sjr) -  PO   1 tab





  DAILY ONUR   Administration


 


Pseudoephedrine/Triprolidine  1 combo 09/26/17 16:50 10/09/17 15:29





  Actifed -  PO   1 combo





  TID PRN   Administration





  NASAL CONGESTION   


 


Quetiapine Fumarate  25 mg 09/29/17 14:00 10/16/17 13:37





  Seroquel -  PO   25 mg





  TID@1000,1400,1800 ONUR   Administration


 


Quetiapine Fumarate  200 mg 10/13/17 22:00 10/15/17 21:38





  Seroquel -  PO   200 mg





  HS ONUR   Administration


 


Thiamine HCl  100 mg 09/26/17 22:00 10/15/17 21:37





  Vitamin B1 -  PO   100 mg





  HS ONUR   Administration











Current Side Effect: No


Lab tests ordered: No


Lab tests reviewed: Yes


Provider note:: Patient will complete this program tomorrow 10/17/17.She has 

met her treatment goals and will continue to address her issues on outpatient 

basis .Patient reports finding that Seroquel 25 mg po tid and 200 mg po hs,

Neurontin 300 mg po tid and Lexapro 10 mg po daily help to cope with depressed 

mood ,anxiety,mood instability.Scripts for 30 days supply provided.  Patient 

iudentifies areas of difficulties,ways,behaviors she can utilize to maintain 

recovery.  Patient is stable for discharge tomorrow 10/17/17.


Total face to face time:: 30





Mental Status Exam





- Mental Status Exam


Alert and Oriented to: Time, Place, Person


Cognitive Function: Grossly Intact


Patient Appearance: Well Groomed


Mood: Hopeful, Euthymic


Affect: Appropriate, Mood Congruent


Patient Behavior: Appropriate, Cooperative


Speech Pattern: Clear


Voice Loudness: Normal


Thought Process: Goal Oriented


Thought Disorder: Not Present


Hallucinations: Denies


Suicidal Ideation: Denies


Homicidal Ideation: Denies


Insight/Judgement: Fair


Sleep: Fair


Appetite: Fair


Muscle strength/Tone: Normal


Gait/Station: Normal





Psychiatric Treatment Plan





- Problem List


(1) Cocaine dependence


Qualifiers: 


     Substance use status: uncomplicated        Qualified Code(s): F14.20 - 

Cocaine dependence, uncomplicated; F14.20 - Cocaine dependence, uncomplicated; 

F14.20 - Cocaine dependence, uncomplicated  





(2) Gastritis


Qualifiers: 


     Chronicity: unspecified     Gastritis bleeding: without bleeding





(3) Hypertension


Qualifiers: 


     Hypertension type: essential hypertension        Qualified Code(s): I10 - 

Essential (primary) hypertension; I10 - Essential (primary) hypertension; I10 - 

Essential (primary) hypertension  





(4) Nicotine dependence


Qualifiers: 


     Nicotine product type: cigarettes     Substance use status: uncomplicated 

       Qualified Code(s): F17.210 - Nicotine dependence, cigarettes, 

uncomplicated; F17.210 - Nicotine dependence, cigarettes, uncomplicated  








(6) Bipolar disorder


Qualifiers: 


     Current episode severity: unspecified

## 2017-10-17 VITALS — SYSTOLIC BLOOD PRESSURE: 147 MMHG | HEART RATE: 82 BPM | DIASTOLIC BLOOD PRESSURE: 98 MMHG

## 2017-10-17 VITALS — TEMPERATURE: 97.7 F

## 2017-10-17 RX ADMIN — GABAPENTIN SCH MG: 300 CAPSULE ORAL at 06:11

## 2017-10-17 RX ADMIN — ESCITALOPRAM OXALATE SCH MG: 10 TABLET, FILM COATED ORAL at 09:29

## 2017-10-17 RX ADMIN — AMOXICILLIN SCH MG: 500 CAPSULE ORAL at 06:11

## 2017-10-17 RX ADMIN — QUETIAPINE FUMARATE SCH MG: 25 TABLET ORAL at 09:29

## 2017-10-17 RX ADMIN — METOPROLOL TARTRATE SCH: 50 TABLET, FILM COATED ORAL at 09:30

## 2017-10-17 RX ADMIN — LIDOCAINE SCH PATCH: 50 PATCH TOPICAL at 09:29

## 2017-10-17 RX ADMIN — PANTOPRAZOLE SODIUM SCH MG: 40 TABLET, DELAYED RELEASE ORAL at 09:29

## 2017-10-17 RX ADMIN — Medication SCH TAB: at 09:29

## 2017-10-17 RX ADMIN — FLUOCINONIDE SCH: 0.5 OINTMENT TOPICAL at 06:11

## 2017-10-17 RX ADMIN — NICOTINE SCH: 21 PATCH TRANSDERMAL at 09:31

## 2017-10-17 RX ADMIN — AMLODIPINE BESYLATE SCH MG: 10 TABLET ORAL at 09:29

## 2017-10-17 RX ADMIN — NAPROXEN SCH MG: 500 TABLET ORAL at 09:29

## 2017-10-17 RX ADMIN — ASPIRIN SCH MG: 81 TABLET, COATED ORAL at 09:29

## 2018-04-04 ENCOUNTER — HOSPITAL ENCOUNTER (INPATIENT)
Dept: HOSPITAL 74 - YASAS | Age: 53
LOS: 4 days | Discharge: OTHER INSTITUTION | DRG: 773 | End: 2018-04-08
Attending: INTERNAL MEDICINE | Admitting: INTERNAL MEDICINE
Payer: COMMERCIAL

## 2018-04-04 VITALS — BODY MASS INDEX: 22.1 KG/M2

## 2018-04-04 DIAGNOSIS — R63.4: ICD-10-CM

## 2018-04-04 DIAGNOSIS — I10: ICD-10-CM

## 2018-04-04 DIAGNOSIS — Z91.5: ICD-10-CM

## 2018-04-04 DIAGNOSIS — E78.00: ICD-10-CM

## 2018-04-04 DIAGNOSIS — K29.70: ICD-10-CM

## 2018-04-04 DIAGNOSIS — F13.230: ICD-10-CM

## 2018-04-04 DIAGNOSIS — F17.210: ICD-10-CM

## 2018-04-04 DIAGNOSIS — G47.00: ICD-10-CM

## 2018-04-04 DIAGNOSIS — F11.23: Primary | ICD-10-CM

## 2018-04-04 DIAGNOSIS — F43.10: ICD-10-CM

## 2018-04-04 DIAGNOSIS — J45.22: ICD-10-CM

## 2018-04-04 DIAGNOSIS — F31.9: ICD-10-CM

## 2018-04-04 DIAGNOSIS — F10.230: ICD-10-CM

## 2018-04-04 DIAGNOSIS — K21.9: ICD-10-CM

## 2018-04-04 LAB
APPEARANCE UR: CLEAR
BILIRUB UR STRIP.AUTO-MCNC: NEGATIVE MG/DL
COLOR UR: (no result)
KETONES UR QL STRIP: NEGATIVE
LEUKOCYTE ESTERASE UR QL STRIP.AUTO: NEGATIVE
NITRITE UR QL STRIP: NEGATIVE
PH UR: 6 [PH] (ref 5–8)
PROT UR QL STRIP: NEGATIVE
PROT UR QL STRIP: NEGATIVE
RBC # UR STRIP: NEGATIVE /UL
SP GR UR: 1 (ref 1–1.03)
UROBILINOGEN UR STRIP-MCNC: NEGATIVE MG/DL (ref 0.2–1)

## 2018-04-04 PROCEDURE — HZ2ZZZZ DETOXIFICATION SERVICES FOR SUBSTANCE ABUSE TREATMENT: ICD-10-PCS | Performed by: INTERNAL MEDICINE

## 2018-04-04 RX ADMIN — NICOTINE SCH: 14 PATCH, EXTENDED RELEASE TRANSDERMAL at 15:20

## 2018-04-04 RX ADMIN — BUDESONIDE AND FORMOTEROL FUMARATE DIHYDRATE SCH PUFF: 80; 4.5 AEROSOL RESPIRATORY (INHALATION) at 22:16

## 2018-04-04 RX ADMIN — TRIAMCINOLONE ACETONIDE SCH: 1 OINTMENT TOPICAL at 17:46

## 2018-04-04 RX ADMIN — RANITIDINE SCH MG: 150 TABLET ORAL at 15:20

## 2018-04-04 RX ADMIN — TRIAMCINOLONE ACETONIDE SCH APPLIC: 1 OINTMENT TOPICAL at 16:03

## 2018-04-04 RX ADMIN — ZOLPIDEM TARTRATE PRN MG: 5 TABLET, COATED ORAL at 22:15

## 2018-04-04 RX ADMIN — RANITIDINE SCH MG: 150 TABLET ORAL at 22:17

## 2018-04-04 RX ADMIN — GABAPENTIN SCH MG: 300 CAPSULE ORAL at 15:16

## 2018-04-04 RX ADMIN — GABAPENTIN SCH MG: 300 CAPSULE ORAL at 22:16

## 2018-04-04 RX ADMIN — Medication SCH MG: at 22:17

## 2018-04-04 RX ADMIN — QUETIAPINE FUMARATE SCH MG: 25 TABLET ORAL at 22:16

## 2018-04-04 RX ADMIN — TRIAMCINOLONE ACETONIDE SCH APPLIC: 1 OINTMENT TOPICAL at 22:15
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
## 2018-04-04 NOTE — HP
CIWA Score





- CIWA Score


Nausea/Vomitin-Mild Nausea/No Vomiting


Muscle Tremors: 4-Moderate,w/Arms Extend


Anxiety: 4-Mod. Anxious/Guarded


Agitation: 4-Moderately Restless


Paroxysmal Sweats: 1-Minimal Palms Moist


Orientation: 0-Oriented


Tacttile Disturbances: 2-Mild Itch/Numbness/Burn


Auditory Disturbances: 0-None


Visual Disturbances: 0-None


Headache: 2-Mild


CIWA-Ar Total Score: 18





Admission ROS S





- HPI


Chief Complaint: 





withdrawal sx


Allergies/Adverse Reactions: 


 Allergies











Allergy/AdvReac Type Severity Reaction Status Date / Time


 


No Known Allergies Allergy   Verified 17 16:08











History of Present Illness: 





52 years old female with long history of alcohol cocaine dependence has eczema 

weight loss hypercholestrolemia gerd hypertension asthma neuropathy and 

depression is admitted to detox


Exam Limitations: No Limitations





- Ebola screening


Have you traveled outside of the country in the last 21 days: No


Have you had contact with anyone from an Ebola affected area: No


Have you been sick,other than usual withdrawal symptoms: No


Do you have a fever: No





- Review of Systems


Constitutional: Loss of Appetite, Changes in sleep, Unintentional Wgt. Loss, 

Unexplained wgt Loss


EENT: reports: Blurred Vision (eye glasses)


Respiratory: reports: SOB with Exertion


Cardiac: reports: No Symptoms Reported


GI: reports: Nausea, Poor Appetite, Poor Fluid Intake, Indigestion, Abdominal 

cramping


: reports: No Symptoms Reported


Musculoskeletal: reports: Joint Pain (right knee pain from fall "week" ago scab 

with hyperpigmented)


Integumentary: reports: Pruritus, Sweating (right knee)


Neuro: reports: Tremors


Endocrine: reports: No Symptoms Reported


Hematology: reports: No Symptoms Reported


Psychiatric: reports: Judgement Intact, Orientated x3, Depressed


Other Systems: Reviewed and Negative





Patient History





- Patient Medical History


Hx Anemia: Yes


Hx Asthma: Yes


Hx Chronic Obstructive Pulmonary Disease (COPD): No


Hx Cancer: No


Hx Cardiac Disorders: No


Hx Congestive Heart Failure: No


Hx Hypertension: Yes


Hx Hypercholesterolemia: Yes


Hx Pacemaker: No


HX Cerebrovascular Accident: No


Hx Seizures: No


Hx Dementia: No


Hx Diabetes: No


Hx Gastrointestinal Disorders: Yes


Hx Liver Disease: No


Hx Genitourinary Disorders: No


Hx Sexually Transmitted Disorders: No


Hx Renal Disease (ESRD): Yes


Hx Thyroid Disease: No


Hx Human Immunodeficiency Virus (HIV): No (negative)


Hx Hepatitis C: No (negative)


Hx Depression: No


Hx Suicide Attempt: Yes


Hx Bipolar Disorder: Yes


Hx Schizophrenia: No





- Patient Surgical History


Past Surgical History: No


Hx Neurologic Surgery: No


Hx Cataract Extraction: No


Hx Cardiac Surgery: No


Hx Lung Surgery: No


Hx Breast Surgery: No


Hx Breast Biopsy: No


Hx Abdominal Surgery: No


Hx Appendectomy: No


Hx Cholecystectomy: No


Hx Genitourinary Surgery: No


Hx  Section: No


Hx Orthopedic Surgery: No


Hx Hysterectomy: No





- PPD History


Previous Implant?: Yes


Documented Results: Negative w/proof


Implanted On Prior [facility] Admission?: Yes


Date: 17


PPD to be Administered?: No





- Reproductive History


Patient is a Female of Child Bearing Age (11 -55 yrs old): Yes


Last Menstrual Period: 17


Patient Pregnant: No





- Smoking Cessation


Smoking history: Current every day smoker


Have you smoked in the past 12 months: Yes


Aproximately how many cigarettes per day: 10


Hx Chewing Tobacco Use: No


Initiated information on smoking cessation: Yes


'Breaking Loose' booklet given: 18





- Substance & Tx. History


Hx Alcohol Use: Yes


Hx Substance Use: Yes


Substance Use Type: Alcohol, Cocaine, Heroin


Hx Substance Use Treatment: Yes (2017 [facility]





- Substances Abused


  ** Alcohol-vodka/beer


Route: Oral


Frequency: Daily


Amount used: 1 pt./2-6 pks.


Age of first use: 12


Date of Last Use: 18





  ** Xanax


Route: Oral


Frequency: 1-3 times last 30 days


Amount used: 2 mg.


Age of first use: 52


Date of Last Use: 18





Family Disease History





- Family Disease History


Family Disease History: Diabetes: Grandparent, Heart Disease: Grandparent, Other

: Father (no contact), Mother ()





Admission Physical Exam [facility]





- Vital Signs


Vital Signs: 


 Vital Signs - 24 hr











  18





  11:31


 


Temperature 98.6 F


 


Pulse Rate 62


 


Respiratory 18





Rate 


 


Blood Pressure 145/79














- Physical


General Appearance: Yes: Appropriately Dressed, Moderate Distress, Thin, 

Tremorous, Irritable, Sweating, Anxious


HEENTM: Yes: Hearing grossly Normal, Normal ENT Inspection, Normocephalic, 

Normal Voice, Other (eye glasses)


Respiratory: Yes: Chest Non-Tender, No Respiratory Distress, No Accessory 

Muscle Use, Wheezing, Expiration


Neck: Yes: Supple, Trachea in good position


Breast: Yes: Breasts Symetrical


Cardiology: Yes: Regular Rhythm, S1, S2, Bradycardia


Abdominal: Yes: Non Tender, Soft


Genitourinary: Yes: Within Normal Limits


Back: Yes: Normal Inspection


Musculoskeletal: Yes: full range of Motion, Gait Steady, Joint swelling (left 

knee)


Extremities: Yes: Normal Range of Motion, Non-Tender, Tremors, Swelling (right 

knee)


Neurological: Yes: Fully Oriented, Alert, Motor Strength 5/5, Normal Response, 

Depressed Affect


Integumentary: Yes: Warm, Other (right knee)


Lymphatic: Yes: Within Normal Limits





- Diagnostic


(1) GERD (gastroesophageal reflux disease)


Current Visit: Yes   Status: Chronic   


Qualifiers: 


   Esophagitis presence: without esophagitis   Qualified Code(s): K21.9 - Gastro

-esophageal reflux disease without esophagitis   





(2) Asthma


Current Visit: Yes   Status: Chronic   


Qualifiers: 


   Asthma severity: mild   Asthma persistence: intermittent   Asthma 

complication type: with status asthmaticus   Qualified Code(s): J45.22 - Mild 

intermittent asthma with status asthmaticus   





(3) Hypercholesteremia


Current Visit: Yes   Status: Chronic   





(4) Weight loss


Current Visit: Yes   Status: Acute   





(5) Alcohol dependence with uncomplicated withdrawal


Current Visit: Yes   Status: Acute   





(6) Bipolar disorder


Current Visit: Yes   Status: Suspected   


Qualifiers: 


   Current bipolar episode type: depressed   Current episode severity: 

unspecified 





(7) Hypertension


Current Visit: Yes   Status: Chronic   


Qualifiers: 


   Hypertension type: essential hypertension   Qualified Code(s): I10 - 

Essential (primary) hypertension   





(8) Methadone maintenance therapy patient


Current Visit: Yes   Status: Chronic   Comment: 60 mg po daily   





(9) Nicotine dependence


Current Visit: Yes   Status: Acute   


Qualifiers: 


   Nicotine product type: cigarettes   Substance use status: in withdrawal   

Qualified Code(s): F17.213 - Nicotine dependence, cigarettes, with withdrawal   





Cleared for Admission [facility]





- Detox or Rehab


[facility] Level of Care: Medically Managed


Detox Regimen/Protocol: Librium





[facility] Breath Alcohol Content


Breath Alcohol Content: 0





Urine Pregancy Test





- Result


Urine Pregnancy Test Results: Negative- NO Line Present





Urine Drug Screen





- Results


Drug Screen Negative: No


Urine Drug Screen Results: STACIA-Cocaine, OPI-Opiates, MTD-Methadone, TCA-

Tricyclic Antidepress

## 2018-04-05 LAB
ALBUMIN SERPL-MCNC: 3.2 G/DL (ref 3.4–5)
ALP SERPL-CCNC: 93 U/L (ref 45–117)
ALT SERPL-CCNC: 19 U/L (ref 12–78)
ANION GAP SERPL CALC-SCNC: 5 MMOL/L (ref 8–16)
AST SERPL-CCNC: 14 U/L (ref 15–37)
BILIRUB SERPL-MCNC: < 0.1 MG/DL (ref 0.2–1)
BUN SERPL-MCNC: 12 MG/DL (ref 7–18)
CALCIUM SERPL-MCNC: 9.1 MG/DL (ref 8.5–10.1)
CHLORIDE SERPL-SCNC: 104 MMOL/L (ref 98–107)
CO2 SERPL-SCNC: 33 MMOL/L (ref 21–32)
CREAT SERPL-MCNC: 0.7 MG/DL (ref 0.55–1.02)
DEPRECATED RDW RBC AUTO: 14.9 % (ref 11.6–15.6)
GLUCOSE SERPL-MCNC: 64 MG/DL (ref 74–106)
HCT VFR BLD CALC: 37.5 % (ref 32.4–45.2)
HGB BLD-MCNC: 12.4 GM/DL (ref 10.7–15.3)
MCH RBC QN AUTO: 26.7 PG (ref 25.7–33.7)
MCHC RBC AUTO-ENTMCNC: 33.1 G/DL (ref 32–36)
MCV RBC: 80.7 FL (ref 80–96)
PLATELET # BLD AUTO: 291 K/MM3 (ref 134–434)
PMV BLD: 10.1 FL (ref 7.5–11.1)
POTASSIUM SERPLBLD-SCNC: 4.1 MMOL/L (ref 3.5–5.1)
PROT SERPL-MCNC: 6.9 G/DL (ref 6.4–8.2)
RBC # BLD AUTO: 4.65 M/MM3 (ref 3.6–5.2)
SODIUM SERPL-SCNC: 142 MMOL/L (ref 136–145)
WBC # BLD AUTO: 5.4 K/MM3 (ref 4–10)

## 2018-04-05 RX ADMIN — GABAPENTIN SCH MG: 300 CAPSULE ORAL at 05:44

## 2018-04-05 RX ADMIN — BUDESONIDE AND FORMOTEROL FUMARATE DIHYDRATE SCH PUFF: 80; 4.5 AEROSOL RESPIRATORY (INHALATION) at 09:55

## 2018-04-05 RX ADMIN — AMLODIPINE BESYLATE SCH MG: 10 TABLET ORAL at 09:55

## 2018-04-05 RX ADMIN — QUETIAPINE FUMARATE SCH MG: 25 TABLET ORAL at 22:15

## 2018-04-05 RX ADMIN — ZOLPIDEM TARTRATE PRN MG: 5 TABLET, COATED ORAL at 22:15

## 2018-04-05 RX ADMIN — NICOTINE SCH: 14 PATCH, EXTENDED RELEASE TRANSDERMAL at 10:50

## 2018-04-05 RX ADMIN — GABAPENTIN SCH MG: 300 CAPSULE ORAL at 14:21

## 2018-04-05 RX ADMIN — TRIAMCINOLONE ACETONIDE SCH: 1 CREAM TOPICAL at 10:45

## 2018-04-05 RX ADMIN — GABAPENTIN SCH MG: 300 CAPSULE ORAL at 22:15

## 2018-04-05 RX ADMIN — RANITIDINE SCH MG: 150 TABLET ORAL at 22:15

## 2018-04-05 RX ADMIN — BUDESONIDE AND FORMOTEROL FUMARATE DIHYDRATE SCH PUFF: 80; 4.5 AEROSOL RESPIRATORY (INHALATION) at 22:17

## 2018-04-05 RX ADMIN — Medication SCH: at 22:17

## 2018-04-05 RX ADMIN — METHADONE HYDROCHLORIDE SCH MG: 40 TABLET ORAL at 05:40

## 2018-04-05 RX ADMIN — TRIAMCINOLONE ACETONIDE SCH APPLIC: 1 CREAM TOPICAL at 22:16

## 2018-04-05 RX ADMIN — TRIAMCINOLONE ACETONIDE SCH APPLIC: 1 OINTMENT TOPICAL at 09:55

## 2018-04-05 RX ADMIN — Medication SCH MG: at 22:15

## 2018-04-05 RX ADMIN — Medication SCH TAB: at 09:55

## 2018-04-05 RX ADMIN — LIDOCAINE SCH PATCH: 50 PATCH TOPICAL at 11:01

## 2018-04-05 RX ADMIN — ASPIRIN SCH MG: 81 TABLET, COATED ORAL at 09:55

## 2018-04-05 RX ADMIN — RANITIDINE SCH MG: 150 TABLET ORAL at 09:55

## 2018-04-05 NOTE — PN
S CIWA





- CIWA Score


Nausea/Vomiting: 3


Muscle Tremors: 3


Anxiety: 3


Agitation: 3


Paroxysmal Sweats: 3


Orientation: 0-Oriented


Tacttile Disturbances: 0-None


Auditory Disturbances: 0-None


Visual Disturbances: 0-None


Headache: 0-None Present


CIWA-Ar Total Score: 15





[facility] Progress Note (SOAP)


Subjective: 





nasuea, sweats, interrutped sleep, anxiety, tremors


Objective: 





04/05/18 09:48


 Vital Signs - 24 hr











  04/04/18 04/04/18 04/04/18





  11:31 18:43 23:05


 


Temperature 98.6 F 97.9 F 97.2 F L


 


Pulse Rate 62 60 56 L


 


Respiratory 18 18 18





Rate   


 


Blood Pressure 145/79 109/63 154/80














  04/05/18 04/05/18 04/05/18





  00:30 03:30 06:25


 


Temperature   96.2 F L


 


Pulse Rate   51 L


 


Respiratory 18 18 16





Rate   


 


Blood Pressure   134/72








 Laboratory Tests











  04/04/18





  17:00


 


Urine Color  Straw


 


Urine Appearance  Clear


 


Urine pH  6.0


 


Ur Specific Gravity  1.005


 


Urine Protein  Negative


 


Urine Glucose (UA)  Negative


 


Urine Ketones  Negative


 


Urine Blood  Negative


 


Urine Nitrite  Negative


 


Urine Bilirubin  Negative


 


Urine Urobilinogen  Negative


 


Ur Leukocyte Esterase  Negative








labs still pending


Assessment: 





04/05/18 09:48


withdrawal sx - cont detox, fluids, encourage ambulation

## 2018-04-05 NOTE — EKG
Test Reason : 

Blood Pressure : ***/*** mmHG

Vent. Rate : 058 BPM     Atrial Rate : 058 BPM

   P-R Int : 172 ms          QRS Dur : 084 ms

    QT Int : 458 ms       P-R-T Axes : 052 065 044 degrees

   QTc Int : 449 ms

 

SINUS BRADYCARDIA

MODERATE VOLTAGE CRITERIA FOR LVH, MAY BE NORMAL VARIANT

BORDERLINE ECG

WHEN COMPARED WITH ECG OF 26-SEP-2017 21:13,

T WAVE INVERSION NO LONGER EVIDENT IN INFERIOR LEADS

Confirmed by SILVIA AMAYA, MARIANGEL ([id number]) on 4/5/2018 11:08:38 AM

 

Referred By:             Confirmed By:MARIANGEL VEGA MD

## 2018-04-06 RX ADMIN — CITALOPRAM HYDROBROMIDE SCH MG: 20 TABLET ORAL at 10:17

## 2018-04-06 RX ADMIN — BUDESONIDE AND FORMOTEROL FUMARATE DIHYDRATE SCH PUFF: 80; 4.5 AEROSOL RESPIRATORY (INHALATION) at 10:17

## 2018-04-06 RX ADMIN — QUETIAPINE FUMARATE SCH MG: 25 TABLET ORAL at 22:31

## 2018-04-06 RX ADMIN — RANITIDINE SCH MG: 150 TABLET ORAL at 22:31

## 2018-04-06 RX ADMIN — RANITIDINE SCH MG: 150 TABLET ORAL at 10:17

## 2018-04-06 RX ADMIN — BUDESONIDE AND FORMOTEROL FUMARATE DIHYDRATE SCH PUFF: 80; 4.5 AEROSOL RESPIRATORY (INHALATION) at 22:31

## 2018-04-06 RX ADMIN — Medication SCH TAB: at 10:17

## 2018-04-06 RX ADMIN — Medication SCH: at 23:32

## 2018-04-06 RX ADMIN — ASPIRIN SCH MG: 81 TABLET, COATED ORAL at 10:17

## 2018-04-06 RX ADMIN — Medication SCH MG: at 22:31

## 2018-04-06 RX ADMIN — METHADONE HYDROCHLORIDE SCH MG: 40 TABLET ORAL at 06:30

## 2018-04-06 RX ADMIN — NICOTINE SCH: 14 PATCH, EXTENDED RELEASE TRANSDERMAL at 10:59

## 2018-04-06 RX ADMIN — AMLODIPINE BESYLATE SCH MG: 10 TABLET ORAL at 10:17

## 2018-04-06 RX ADMIN — TRIAMCINOLONE ACETONIDE SCH APPLIC: 1 CREAM TOPICAL at 10:16

## 2018-04-06 RX ADMIN — TRIAMCINOLONE ACETONIDE SCH: 1 CREAM TOPICAL at 23:32

## 2018-04-06 RX ADMIN — LIDOCAINE SCH PATCH: 50 PATCH TOPICAL at 10:59

## 2018-04-06 RX ADMIN — GABAPENTIN SCH MG: 300 CAPSULE ORAL at 13:09

## 2018-04-06 RX ADMIN — GABAPENTIN SCH MG: 300 CAPSULE ORAL at 06:32

## 2018-04-06 RX ADMIN — GABAPENTIN SCH MG: 300 CAPSULE ORAL at 22:32

## 2018-04-06 NOTE — PN
S CIWA





- CIWA Score


Nausea/Vomitin


Muscle Tremors: 3


Anxiety: 3


Agitation: 3


Paroxysmal Sweats: 1-Minimal Palms Moist


Orientation: 0-Oriented


Tacttile Disturbances: 0-None


Auditory Disturbances: 0-None


Visual Disturbances: 0-None


Headache: 0-None Present


CIWA-Ar Total Score: 12





[facility] Progress Note (SOAP)


Subjective: 





nausea, sweats, interrupted sleep, anxiety, tremors


Objective: 





18 10:38


 Vital Signs - 24 hr











  18





  15:10 18:06 00:30


 


Temperature 98.1 F 98.1 F 


 


Pulse Rate 63 66 


 


Respiratory 18 20 16





Rate   


 


Blood Pressure 130/73 119/75 














  18





  03:30 06:00 10:32


 


Temperature  97.7 F 97.4 F L


 


Pulse Rate  63 96 H


 


Respiratory 18 18 19





Rate   


 


Blood Pressure  104/49 127/76








 Laboratory Tests











  18





  17:00 06:00 06:00


 


WBC   5.4 


 


RBC   4.65 


 


Hgb   12.4 


 


Hct   37.5 


 


MCV   80.7 


 


MCH   26.7 


 


MCHC   33.1 


 


RDW   14.9 


 


Plt Count   291  D 


 


MPV   10.1 


 


Sodium    142


 


Potassium    4.1


 


Chloride    104


 


Carbon Dioxide    33 H


 


Anion Gap    5 L


 


BUN    12


 


Creatinine    0.7


 


Creat Clearance w eGFR    > 60


 


Random Glucose    64 L


 


Calcium    9.1


 


Total Bilirubin    < 0.1 L D


 


AST    14 L


 


ALT    19


 


Alkaline Phosphatase    93


 


Total Protein    6.9


 


Albumin    3.2 L


 


Urine Color  Straw  


 


Urine Appearance  Clear  


 


Urine pH  6.0  


 


Ur Specific Gravity  1.005  


 


Urine Protein  Negative  


 


Urine Glucose (UA)  Negative  


 


Urine Ketones  Negative  


 


Urine Blood  Negative  


 


Urine Nitrite  Negative  


 


Urine Bilirubin  Negative  


 


Urine Urobilinogen  Negative  


 


Ur Leukocyte Esterase  Negative  


 


RPR Titer   














  18





  06:00


 


WBC 


 


RBC 


 


Hgb 


 


Hct 


 


MCV 


 


MCH 


 


MCHC 


 


RDW 


 


Plt Count 


 


MPV 


 


Sodium 


 


Potassium 


 


Chloride 


 


Carbon Dioxide 


 


Anion Gap 


 


BUN 


 


Creatinine 


 


Creat Clearance w eGFR 


 


Random Glucose 


 


Calcium 


 


Total Bilirubin 


 


AST 


 


ALT 


 


Alkaline Phosphatase 


 


Total Protein 


 


Albumin 


 


Urine Color 


 


Urine Appearance 


 


Urine pH 


 


Ur Specific Gravity 


 


Urine Protein 


 


Urine Glucose (UA) 


 


Urine Ketones 


 


Urine Blood 


 


Urine Nitrite 


 


Urine Bilirubin 


 


Urine Urobilinogen 


 


Ur Leukocyte Esterase 


 


RPR Titer  Nonreactive











Assessment: 





18 10:39


withdrawal sx - cont detox, fluids, hypoalbuminemia 2/2 substance use 

malnutirtion ensure ordered

## 2018-04-07 RX ADMIN — RANITIDINE SCH MG: 150 TABLET ORAL at 22:36

## 2018-04-07 RX ADMIN — AMLODIPINE BESYLATE SCH MG: 10 TABLET ORAL at 10:46

## 2018-04-07 RX ADMIN — Medication SCH TAB: at 10:46

## 2018-04-07 RX ADMIN — CITALOPRAM HYDROBROMIDE SCH MG: 20 TABLET ORAL at 10:46

## 2018-04-07 RX ADMIN — Medication SCH MG: at 22:36

## 2018-04-07 RX ADMIN — GABAPENTIN SCH MG: 300 CAPSULE ORAL at 05:49

## 2018-04-07 RX ADMIN — BUDESONIDE AND FORMOTEROL FUMARATE DIHYDRATE SCH PUFF: 80; 4.5 AEROSOL RESPIRATORY (INHALATION) at 22:36

## 2018-04-07 RX ADMIN — BUDESONIDE AND FORMOTEROL FUMARATE DIHYDRATE SCH PUFF: 80; 4.5 AEROSOL RESPIRATORY (INHALATION) at 10:44

## 2018-04-07 RX ADMIN — TRIAMCINOLONE ACETONIDE SCH APPLIC: 1 CREAM TOPICAL at 10:45

## 2018-04-07 RX ADMIN — GABAPENTIN SCH MG: 300 CAPSULE ORAL at 14:33

## 2018-04-07 RX ADMIN — Medication SCH: at 22:35

## 2018-04-07 RX ADMIN — METHADONE HYDROCHLORIDE SCH MG: 40 TABLET ORAL at 05:49

## 2018-04-07 RX ADMIN — QUETIAPINE FUMARATE SCH MG: 25 TABLET ORAL at 22:36

## 2018-04-07 RX ADMIN — GABAPENTIN SCH MG: 300 CAPSULE ORAL at 22:36

## 2018-04-07 RX ADMIN — NICOTINE SCH: 14 PATCH, EXTENDED RELEASE TRANSDERMAL at 10:44

## 2018-04-07 RX ADMIN — TRIAMCINOLONE ACETONIDE SCH: 1 CREAM TOPICAL at 22:35

## 2018-04-07 RX ADMIN — ASPIRIN SCH MG: 81 TABLET, COATED ORAL at 10:46

## 2018-04-07 RX ADMIN — RANITIDINE SCH MG: 150 TABLET ORAL at 10:46

## 2018-04-07 RX ADMIN — LIDOCAINE SCH PATCH: 50 PATCH TOPICAL at 10:46

## 2018-04-07 NOTE — PN
[facility] Progress Note (SOAP)


Subjective: 





Generalized pain, sleeplessness, anxiety and irritability


Objective: 





04/07/18 13:37


 Vital Signs - 8 hr











  04/07/18 04/07/18





  06:00 10:00


 


Temperature 97.5 F L 96.3 F L


 


Pulse Rate 64 65


 


Respiratory 16 18





Rate  


 


Blood Pressure 140/81 124/76








 Laboratory Last Values











WBC  5.4 K/mm3 (4.0-10.0)   04/05/18  06:00    


 


RBC  4.65 M/mm3 (3.60-5.2)   04/05/18  06:00    


 


Hgb  12.4 GM/dL (10.7-15.3)   04/05/18  06:00    


 


Hct  37.5 % (32.4-45.2)   04/05/18  06:00    


 


MCV  80.7 fl (80-96)   04/05/18  06:00    


 


MCH  26.7 pg (25.7-33.7)   04/05/18  06:00    


 


MCHC  33.1 g/dl (32.0-36.0)   04/05/18  06:00    


 


RDW  14.9 % (11.6-15.6)   04/05/18  06:00    


 


Plt Count  291 K/MM3 (134-434)  D 04/05/18  06:00    


 


MPV  10.1 fl (7.5-11.1)   04/05/18  06:00    


 


Sodium  142 mmol/L (136-145)   04/05/18  06:00    


 


Potassium  4.1 mmol/L (3.5-5.1)   04/05/18  06:00    


 


Chloride  104 mmol/L ()   04/05/18  06:00    


 


Carbon Dioxide  33 mmol/L (21-32)  H  04/05/18  06:00    


 


Anion Gap  5  (8-16)  L  04/05/18  06:00    


 


BUN  12 mg/dL (7-18)   04/05/18  06:00    


 


Creatinine  0.7 mg/dL (0.55-1.02)   04/05/18  06:00    


 


Creat Clearance w eGFR  > 60  (>60)   04/05/18  06:00    


 


Random Glucose  64 mg/dL ()  L  04/05/18  06:00    


 


Calcium  9.1 mg/dL (8.5-10.1)   04/05/18  06:00    


 


Total Bilirubin  < 0.1 mg/dL (0.2-1.0)  L D 04/05/18  06:00    


 


AST  14 U/L (15-37)  L  04/05/18  06:00    


 


ALT  19 U/L (12-78)   04/05/18  06:00    


 


Alkaline Phosphatase  93 U/L ()   04/05/18  06:00    


 


Total Protein  6.9 g/dl (6.4-8.2)   04/05/18  06:00    


 


Albumin  3.2 g/dl (3.4-5.0)  L  04/05/18  06:00    


 


Urine Color  Straw   04/04/18  17:00    


 


Urine Appearance  Clear   04/04/18  17:00    


 


Urine pH  6.0  (5.0-8.0)   04/04/18  17:00    


 


Ur Specific Gravity  1.005  (1.001-1.035)   04/04/18  17:00    


 


Urine Protein  Negative  (NEGATIVE)   04/04/18  17:00    


 


Urine Glucose (UA)  Negative  (NEGATIVE)   04/04/18  17:00    


 


Urine Ketones  Negative  (NEGATIVE)   04/04/18  17:00    


 


Urine Blood  Negative  (NEGATIVE)   04/04/18  17:00    


 


Urine Nitrite  Negative  (NEGATIVE)   04/04/18  17:00    


 


Urine Bilirubin  Negative  (<2.0 mg/dL)   04/04/18  17:00    


 


Urine Urobilinogen  Negative mg/dL (0.2-1.0)   04/04/18  17:00    


 


Ur Leukocyte Esterase  Negative  (NEGATIVE)   04/04/18  17:00    


 


RPR Titer  Nonreactive  (NONREACTIVE)   04/05/18  06:00    








Labs noted


Assessment: 





04/07/18 13:37


Withdrawal sx


Plan: 





Continue detox

## 2018-04-08 ENCOUNTER — HOSPITAL ENCOUNTER (INPATIENT)
Dept: HOSPITAL 74 - YASAS | Age: 53
LOS: 16 days | Discharge: HOME | DRG: 772 | End: 2018-04-24
Attending: PSYCHIATRY & NEUROLOGY | Admitting: PSYCHIATRY & NEUROLOGY
Payer: COMMERCIAL

## 2018-04-08 VITALS — SYSTOLIC BLOOD PRESSURE: 121 MMHG | HEART RATE: 73 BPM | TEMPERATURE: 97.3 F | DIASTOLIC BLOOD PRESSURE: 61 MMHG

## 2018-04-08 DIAGNOSIS — F11.20: Primary | ICD-10-CM

## 2018-04-08 DIAGNOSIS — J45.22: ICD-10-CM

## 2018-04-08 DIAGNOSIS — K21.9: ICD-10-CM

## 2018-04-08 DIAGNOSIS — K29.70: ICD-10-CM

## 2018-04-08 DIAGNOSIS — F43.10: ICD-10-CM

## 2018-04-08 DIAGNOSIS — F14.20: ICD-10-CM

## 2018-04-08 DIAGNOSIS — E78.00: ICD-10-CM

## 2018-04-08 DIAGNOSIS — F10.20: ICD-10-CM

## 2018-04-08 DIAGNOSIS — I10: ICD-10-CM

## 2018-04-08 DIAGNOSIS — F17.210: ICD-10-CM

## 2018-04-08 DIAGNOSIS — F31.9: ICD-10-CM

## 2018-04-08 PROCEDURE — HZ42ZZZ GROUP COUNSELING FOR SUBSTANCE ABUSE TREATMENT, COGNITIVE-BEHAVIORAL: ICD-10-PCS | Performed by: PSYCHIATRY & NEUROLOGY

## 2018-04-08 RX ADMIN — LIDOCAINE SCH PATCH: 50 PATCH TOPICAL at 10:28

## 2018-04-08 RX ADMIN — QUETIAPINE FUMARATE SCH MG: 100 TABLET ORAL at 21:38

## 2018-04-08 RX ADMIN — GABAPENTIN SCH MG: 300 CAPSULE ORAL at 06:00

## 2018-04-08 RX ADMIN — Medication SCH TAB: at 10:27

## 2018-04-08 RX ADMIN — GABAPENTIN SCH MG: 300 CAPSULE ORAL at 21:38

## 2018-04-08 RX ADMIN — NICOTINE SCH: 14 PATCH, EXTENDED RELEASE TRANSDERMAL at 10:28

## 2018-04-08 RX ADMIN — ASPIRIN SCH MG: 81 TABLET, COATED ORAL at 10:27

## 2018-04-08 RX ADMIN — AMLODIPINE BESYLATE SCH MG: 10 TABLET ORAL at 10:27

## 2018-04-08 RX ADMIN — CITALOPRAM HYDROBROMIDE SCH MG: 20 TABLET ORAL at 10:27

## 2018-04-08 RX ADMIN — Medication SCH MG: at 21:38

## 2018-04-08 RX ADMIN — RANITIDINE SCH MG: 150 TABLET ORAL at 10:27

## 2018-04-08 RX ADMIN — TRIAMCINOLONE ACETONIDE SCH: 1 CREAM TOPICAL at 10:27

## 2018-04-08 RX ADMIN — BUDESONIDE AND FORMOTEROL FUMARATE DIHYDRATE SCH PUFF: 80; 4.5 AEROSOL RESPIRATORY (INHALATION) at 10:29

## 2018-04-08 RX ADMIN — METHADONE HYDROCHLORIDE SCH MG: 40 TABLET ORAL at 06:00

## 2018-04-08 NOTE — DS
[facility] Detox Discharge Summary


Admission Date: 


04/04/18





Discharge Date: 04/08/18





- History


Present History: Alcohol Dependence


Additional Comments: 





52 years old female admitted for alcohol withdrawal sx


completed alcohol regimen tolerated well


patient is alert oriented x 3 no acute distress


wants to go to [facility] for aftercare





- Physical Exam Results


Vital Signs: 


 Vital Signs











Temperature  97.5 F L  04/08/18 06:00


 


Pulse Rate  68   04/08/18 06:00


 


Respiratory Rate  16   04/08/18 06:00


 


Blood Pressure  113/75   04/08/18 06:00


 


O2 Sat by Pulse Oximetry (%)      











Pertinent Admission Physical Exam Findings: 





withdrawal sx


 Laboratory Last Values











WBC  5.4 K/mm3 (4.0-10.0)   04/05/18  06:00    


 


RBC  4.65 M/mm3 (3.60-5.2)   04/05/18  06:00    


 


Hgb  12.4 GM/dL (10.7-15.3)   04/05/18  06:00    


 


Hct  37.5 % (32.4-45.2)   04/05/18  06:00    


 


MCV  80.7 fl (80-96)   04/05/18  06:00    


 


MCH  26.7 pg (25.7-33.7)   04/05/18  06:00    


 


MCHC  33.1 g/dl (32.0-36.0)   04/05/18  06:00    


 


RDW  14.9 % (11.6-15.6)   04/05/18  06:00    


 


Plt Count  291 K/MM3 (134-434)  D 04/05/18  06:00    


 


MPV  10.1 fl (7.5-11.1)   04/05/18  06:00    


 


Sodium  142 mmol/L (136-145)   04/05/18  06:00    


 


Potassium  4.1 mmol/L (3.5-5.1)   04/05/18  06:00    


 


Chloride  104 mmol/L ()   04/05/18  06:00    


 


Carbon Dioxide  33 mmol/L (21-32)  H  04/05/18  06:00    


 


Anion Gap  5  (8-16)  L  04/05/18  06:00    


 


BUN  12 mg/dL (7-18)   04/05/18  06:00    


 


Creatinine  0.7 mg/dL (0.55-1.02)   04/05/18  06:00    


 


Creat Clearance w eGFR  > 60  (>60)   04/05/18  06:00    


 


Random Glucose  64 mg/dL ()  L  04/05/18  06:00    


 


Calcium  9.1 mg/dL (8.5-10.1)   04/05/18  06:00    


 


Total Bilirubin  < 0.1 mg/dL (0.2-1.0)  L D 04/05/18  06:00    


 


AST  14 U/L (15-37)  L  04/05/18  06:00    


 


ALT  19 U/L (12-78)   04/05/18  06:00    


 


Alkaline Phosphatase  93 U/L ()   04/05/18  06:00    


 


Total Protein  6.9 g/dl (6.4-8.2)   04/05/18  06:00    


 


Albumin  3.2 g/dl (3.4-5.0)  L  04/05/18  06:00    


 


Urine Color  Straw   04/04/18  17:00    


 


Urine Appearance  Clear   04/04/18  17:00    


 


Urine pH  6.0  (5.0-8.0)   04/04/18  17:00    


 


Ur Specific Gravity  1.005  (1.001-1.035)   04/04/18  17:00    


 


Urine Protein  Negative  (NEGATIVE)   04/04/18  17:00    


 


Urine Glucose (UA)  Negative  (NEGATIVE)   04/04/18  17:00    


 


Urine Ketones  Negative  (NEGATIVE)   04/04/18  17:00    


 


Urine Blood  Negative  (NEGATIVE)   04/04/18  17:00    


 


Urine Nitrite  Negative  (NEGATIVE)   04/04/18  17:00    


 


Urine Bilirubin  Negative  (<2.0 mg/dL)   04/04/18  17:00    


 


Urine Urobilinogen  Negative mg/dL (0.2-1.0)   04/04/18  17:00    


 


Ur Leukocyte Esterase  Negative  (NEGATIVE)   04/04/18  17:00    


 


RPR Titer  Nonreactive  (NONREACTIVE)   04/05/18  06:00    








lab noted





- Treatment


Hospital Course: Detox Protocol Followed, Detoxed Safely, Responded well, 

Discharged Condition Good, Rehab Referral Accepted





- Medication


Discharge Medications: 


Ambulatory Orders





Aspirin [Ecotrin] 81 mg PO DAILY #30 tablet. 08/11/16 


Biotin 10,000 mcg PO DAILY 04/04/18 


Citalopram Hydrobromide [Citalopram HBr] 20 mg PO DAILY 04/04/18 


Folic Acid - 1 mg PO DAILY 04/04/18 


Multivitamins [Multivit ([facility] Formulary)] 1 tab PO DAILY 04/04/18 


Olanzapine [ZyPREXA -] 10 mg PO HS 04/04/18 


Omega-3/Dha/Epa/Fish Oil [Fish Oil EC 1,200 mg Softgel] 1 each PO DAILY 04/04/ 18 


Quetiapine Fumarate [Seroquel -] 25 mg PO TID 04/04/18 


Thiamine HCl [Vitamin B1 -] 100 mg PO DAILY 04/04/18 


Zolpidem Tartrate [Ambien] 10 mg PO HS PRN 04/04/18 


Albuterol Sulfate Inhaler - [Ventolin HFA Inhaler -] 2 inh PO Q4H PRN #1 

inhaler 04/08/18 


Amlodipine Besylate [Norvasc -] 10 mg PO DAILY #30 tablet 04/08/18 


Clonidine HCl [Catapres] 0.1 mg PO DAILY #30 tablet 04/08/18 


Famotidine [Pepcid] 40 mg PO DAILY #30 tablet 04/08/18 


Gabapentin [Neurontin -] 300 mg PO TID #90 cap 04/08/18 


Methadone [Dolophine -] 60 mg PO DAILY 04/08/18 











- Diagnosis


(1) GERD (gastroesophageal reflux disease)


Current Visit: Yes   Status: Chronic   


Qualifiers: 


   Esophagitis presence: without esophagitis   Qualified Code(s): K21.9 - Gastro

-esophageal reflux disease without esophagitis   





(2) Asthma


Current Visit: Yes   Status: Chronic   


Qualifiers: 


   Asthma severity: mild   Asthma persistence: intermittent   Asthma 

complication type: with status asthmaticus   Qualified Code(s): J45.22 - Mild 

intermittent asthma with status asthmaticus   





(3) Hypercholesteremia


Current Visit: Yes   Status: Chronic   





(4) Weight loss


Current Visit: Yes   Status: Acute   





(5) Alcohol dependence with uncomplicated withdrawal


Current Visit: Yes   Status: Acute   





(6) Bipolar disorder


Current Visit: Yes   Status: Suspected   


Qualifiers: 


   Current bipolar episode type: depressed   Current episode severity: 

unspecified 





(7) Hypertension


Current Visit: Yes   Status: Chronic   


Qualifiers: 


   Hypertension type: essential hypertension   Qualified Code(s): I10 - 

Essential (primary) hypertension   





(8) Methadone maintenance therapy patient


Current Visit: Yes   Status: Chronic   





(9) Nicotine dependence


Current Visit: Yes   Status: Acute   


Qualifiers: 


   Nicotine product type: cigarettes   Substance use status: in withdrawal   

Qualified Code(s): F17.213 - Nicotine dependence, cigarettes, with withdrawal   





- AMA


Did Patient Leave Against Medical Advice: No

## 2018-04-08 NOTE — PN
[facility] Progress Note


Note: 





Psychiatrist on call note:


was called by nurse to enter orders for Celexa 20mg +Seroquel 100mg qhs, 

reviewed the chart, patient was admitted form [facility] and seen by NP.Esteban, 

patient was on the same medications, will place order, psychiatric evaluation 

in am.

## 2018-04-08 NOTE — HP
[facility] MD Rehab Assess/Revision





- Admission History


Admitted to Rehab from: [facility]


Date of Admission to Rehab: 04/08/18





- Vital signs


Vital Signs: 


 Vital Signs











 Period  Temp  Pulse  Resp  BP Sys/[place]  Pulse Ox


 


 Last 24 Hr  97.7 F  76  20  135/82  














- Findings


Detox History & Physical reviewed: Yes


Concur with findings: Yes


Comments/Additional Findings: transferred from [facility] to rehab admission as per 

protocol





Inpatient Rehab Admission





- Initial Determination


Are CD services needed?: Yes


Free of communicable disease: Yes


Not in need of hospitalization: Yes





- Rehab Admission Criteria


Previous failed treatment: Yes


Poor recovery environment: Yes


Comorbidities: Yes


Lacks judgement: No


Patient is meeting Inpatient Rehab admission criteria:: Yes

## 2018-04-09 RX ADMIN — AMLODIPINE BESYLATE SCH MG: 10 TABLET ORAL at 10:09

## 2018-04-09 RX ADMIN — GABAPENTIN SCH MG: 300 CAPSULE ORAL at 06:34

## 2018-04-09 RX ADMIN — Medication SCH MG: at 21:24

## 2018-04-09 RX ADMIN — NICOTINE SCH: 14 PATCH, EXTENDED RELEASE TRANSDERMAL at 10:09

## 2018-04-09 RX ADMIN — ASPIRIN SCH MG: 81 TABLET, COATED ORAL at 10:09

## 2018-04-09 RX ADMIN — GABAPENTIN SCH MG: 300 CAPSULE ORAL at 13:34

## 2018-04-09 RX ADMIN — ESCITALOPRAM OXALATE SCH MG: 10 TABLET, FILM COATED ORAL at 13:35

## 2018-04-09 RX ADMIN — GABAPENTIN SCH MG: 300 CAPSULE ORAL at 21:24

## 2018-04-09 RX ADMIN — Medication SCH TAB: at 10:09

## 2018-04-09 RX ADMIN — QUETIAPINE FUMARATE SCH MG: 100 TABLET ORAL at 21:24

## 2018-04-09 RX ADMIN — METHADONE HYDROCHLORIDE SCH MG: 40 TABLET ORAL at 06:33
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
## 2018-04-10 RX ADMIN — GABAPENTIN SCH: 300 CAPSULE ORAL at 07:00

## 2018-04-10 RX ADMIN — METHADONE HYDROCHLORIDE SCH MG: 40 TABLET ORAL at 10:33

## 2018-04-10 RX ADMIN — ESCITALOPRAM OXALATE SCH MG: 10 TABLET, FILM COATED ORAL at 10:33

## 2018-04-10 RX ADMIN — AMLODIPINE BESYLATE SCH MG: 10 TABLET ORAL at 10:33

## 2018-04-10 RX ADMIN — Medication SCH MG: at 21:48

## 2018-04-10 RX ADMIN — GABAPENTIN SCH MG: 300 CAPSULE ORAL at 21:48

## 2018-04-10 RX ADMIN — GABAPENTIN SCH MG: 300 CAPSULE ORAL at 13:34

## 2018-04-10 RX ADMIN — NICOTINE SCH: 14 PATCH, EXTENDED RELEASE TRANSDERMAL at 10:33

## 2018-04-10 RX ADMIN — QUETIAPINE FUMARATE SCH MG: 100 TABLET ORAL at 21:48

## 2018-04-10 RX ADMIN — ASPIRIN SCH MG: 81 TABLET, COATED ORAL at 10:33

## 2018-04-10 RX ADMIN — Medication SCH TAB: at 10:33

## 2018-04-10 NOTE — PN
Psychiatric Progress Note


Vital Signs: 


 Vital Signs











 Period  Temp  Pulse  Resp  BP Sys/Salinas  Pulse Ox


 


 Last 24 Hr  98.1 F  78  18-18  100/65  











Current Medications: 


Active Medications











Generic Name Dose Route Start Last Admin





  Trade Name Freq  PRN Reason Stop Dose Admin


 


Acetaminophen  650 mg  04/08/18 14:45  





  Tylenol -  PO   





  Q4H PRN   





  FEVER   


 


Al Hydroxide/Mg Hydroxide  30 ml  04/08/18 14:45  





  Mylanta Oral Suspension -  PO   





  Q6H PRN   





  DYSPEPSIA   


 


Albuterol Sulfate  2 puff  04/08/18 14:45  





  Ventolin Hfa Inhaler -  IH   





  Q4H PRN   





  ASTHMA   


 


Amlodipine Besylate  10 mg  04/09/18 10:00  04/09/18 10:09





  Norvasc -  PO   10 mg





  DAILY ONUR   Administration


 


Aspirin  81 mg  04/09/18 10:00  04/09/18 10:09





  Ecotrin -  PO   81 mg





  DAILY ONUR   Administration


 


Clonidine  0.1 mg  04/09/18 10:00  04/09/18 10:09





  Catapres -  PO   0.1 mg





  DAILY ONUR   Administration


 


Escitalopram Oxalate  10 mg  04/09/18 12:15  04/09/18 13:35





  Lexapro -  PO   10 mg





  DAILY ONUR   Administration


 


Eucalyptus/Menthol/Phenol/Sorbitol  1 each  04/08/18 14:45  





  Cepastat Lozenge -  MM   





  Q4H PRN   





  SORE THROAT   


 


Gabapentin  300 mg  04/08/18 22:00  04/10/18 07:00





  Neurontin -  PO   Not Given





  TID ONUR   


 


Guaifenesin  10 ml  04/08/18 14:45  





  Robitussin Dm -  PO   





  Q6H PRN   





  COUGH   


 


Hydroxyzine Pamoate  50 mg  04/09/18 12:03  





  Vistaril -  PO   





  Q4H PRN   





  ANXIETY   


 


Loperamide HCl  4 mg  04/08/18 14:45  





  Imodium -  PO   





  Q6H PRN   





  DIARRHEA   


 


Magnesium Citrate  300 ml  04/08/18 14:45  





  Citroma -  PO   





  Q48H PRN   





  CONSTIPATION   


 


Magnesium Hydroxide  30 ml  04/08/18 14:45  





  Milk Of Magnesia -  PO   





  DAILY PRN   





  CONSTIPATION   


 


Melatonin  5 mg  04/08/18 22:00  





  Melatonin  PO   





  HS PRN   





  INSOMNIA   


 


Methadone HCl 40 mg/ Methadone  60 mg  04/09/18 06:30  04/09/18 06:33





  HCl 20 mg  PO   60 mg





  DAILY@0600 ONUR   Administration


 


Nicotine  14 mg  04/09/18 10:00  04/09/18 10:09





  Nicoderm Patch -  TD   Not Given





  DAILY ONUR   


 


Nicotine Polacrilex  2 mg  04/08/18 14:45  





  Nicorette Gum -  BUC   





  Q2H PRN   





  NICOTINE REPLACEMENT RX   


 


Prenatal Multivit/Folic Acid/Iron  1 tab  04/09/18 10:00  04/09/18 10:09





  Prenatal Vitamins (Sjr) -  PO   1 tab





  DAILY ONUR   Administration


 


Pseudoephedrine/Triprolidine  1 combo  04/08/18 14:45  





  Actifed -  PO   





  TID PRN   





  NASAL CONGESTION   


 


Quetiapine Fumarate  100 mg  04/08/18 22:00  04/09/18 21:24





  Seroquel -  PO   100 mg





  HS ONUR   Administration


 


Thiamine HCl  100 mg  04/08/18 22:00  04/09/18 21:24





  Vitamin B1 -  PO   100 mg





  HS ONUR   Administration

## 2018-04-10 NOTE — PN
[facility] Progress Note


Note: 





Called by nursing staff regarding patient complaining of feeling very lehargic, 

sleepy. She is currenly on Seroquel 100 mg po HS and Trazadone 150 mg po HS. 

Trazadone will be discontinued

## 2018-04-11 RX ADMIN — RANITIDINE SCH MG: 150 TABLET ORAL at 10:40

## 2018-04-11 RX ADMIN — GABAPENTIN SCH MG: 300 CAPSULE ORAL at 14:34

## 2018-04-11 RX ADMIN — QUETIAPINE FUMARATE SCH MG: 100 TABLET ORAL at 21:43

## 2018-04-11 RX ADMIN — Medication SCH MG: at 21:43

## 2018-04-11 RX ADMIN — ESCITALOPRAM OXALATE SCH MG: 10 TABLET, FILM COATED ORAL at 10:40

## 2018-04-11 RX ADMIN — GABAPENTIN SCH MG: 300 CAPSULE ORAL at 06:42

## 2018-04-11 RX ADMIN — METHADONE HYDROCHLORIDE SCH MG: 40 TABLET ORAL at 06:42

## 2018-04-11 RX ADMIN — NICOTINE SCH: 14 PATCH, EXTENDED RELEASE TRANSDERMAL at 10:40

## 2018-04-11 RX ADMIN — AMLODIPINE BESYLATE SCH MG: 10 TABLET ORAL at 10:40

## 2018-04-11 RX ADMIN — Medication SCH TAB: at 10:40

## 2018-04-11 RX ADMIN — GABAPENTIN SCH MG: 300 CAPSULE ORAL at 21:43

## 2018-04-11 RX ADMIN — OMEGA-3-ACID ETHYL ESTERS SCH GM: 1 CAPSULE, LIQUID FILLED ORAL at 12:28

## 2018-04-11 RX ADMIN — ASPIRIN SCH MG: 81 TABLET, COATED ORAL at 10:40

## 2018-04-12 RX ADMIN — AMLODIPINE BESYLATE SCH MG: 10 TABLET ORAL at 10:19

## 2018-04-12 RX ADMIN — NICOTINE SCH: 14 PATCH, EXTENDED RELEASE TRANSDERMAL at 10:19

## 2018-04-12 RX ADMIN — OMEGA-3-ACID ETHYL ESTERS SCH GM: 1 CAPSULE, LIQUID FILLED ORAL at 10:19

## 2018-04-12 RX ADMIN — ASPIRIN SCH MG: 81 TABLET, COATED ORAL at 10:19

## 2018-04-12 RX ADMIN — Medication SCH TAB: at 10:19

## 2018-04-12 RX ADMIN — METHADONE HYDROCHLORIDE SCH MG: 40 TABLET ORAL at 06:46

## 2018-04-12 RX ADMIN — GABAPENTIN SCH MG: 300 CAPSULE ORAL at 13:14

## 2018-04-12 RX ADMIN — Medication SCH MG: at 21:46

## 2018-04-12 RX ADMIN — QUETIAPINE FUMARATE SCH MG: 100 TABLET ORAL at 21:46

## 2018-04-12 RX ADMIN — RANITIDINE SCH MG: 150 TABLET ORAL at 10:19

## 2018-04-12 RX ADMIN — GABAPENTIN SCH MG: 300 CAPSULE ORAL at 06:47

## 2018-04-12 RX ADMIN — GABAPENTIN SCH MG: 300 CAPSULE ORAL at 21:46

## 2018-04-12 RX ADMIN — ESCITALOPRAM OXALATE SCH MG: 10 TABLET, FILM COATED ORAL at 10:19

## 2018-04-13 RX ADMIN — AMLODIPINE BESYLATE SCH MG: 10 TABLET ORAL at 10:53

## 2018-04-13 RX ADMIN — GABAPENTIN SCH MG: 300 CAPSULE ORAL at 13:26

## 2018-04-13 RX ADMIN — ASPIRIN SCH MG: 81 TABLET, COATED ORAL at 10:54

## 2018-04-13 RX ADMIN — HYDROXYZINE PAMOATE PRN MG: 50 CAPSULE ORAL at 06:25

## 2018-04-13 RX ADMIN — GABAPENTIN SCH MG: 300 CAPSULE ORAL at 21:47

## 2018-04-13 RX ADMIN — OMEGA-3-ACID ETHYL ESTERS SCH GM: 1 CAPSULE, LIQUID FILLED ORAL at 12:41

## 2018-04-13 RX ADMIN — Medication SCH MG: at 21:47

## 2018-04-13 RX ADMIN — ACETAMINOPHEN PRN MG: 325 TABLET ORAL at 06:25

## 2018-04-13 RX ADMIN — NICOTINE SCH: 14 PATCH, EXTENDED RELEASE TRANSDERMAL at 10:54

## 2018-04-13 RX ADMIN — METHADONE HYDROCHLORIDE SCH MG: 40 TABLET ORAL at 06:18

## 2018-04-13 RX ADMIN — QUETIAPINE FUMARATE SCH MG: 100 TABLET ORAL at 21:47

## 2018-04-13 RX ADMIN — ESCITALOPRAM OXALATE SCH MG: 10 TABLET, FILM COATED ORAL at 10:54

## 2018-04-13 RX ADMIN — GABAPENTIN SCH MG: 300 CAPSULE ORAL at 06:18

## 2018-04-13 RX ADMIN — Medication SCH TAB: at 10:54

## 2018-04-13 RX ADMIN — RANITIDINE SCH MG: 150 TABLET ORAL at 10:54

## 2018-04-14 RX ADMIN — GABAPENTIN SCH MG: 300 CAPSULE ORAL at 21:52

## 2018-04-14 RX ADMIN — NICOTINE SCH: 14 PATCH, EXTENDED RELEASE TRANSDERMAL at 10:27

## 2018-04-14 RX ADMIN — OMEGA-3-ACID ETHYL ESTERS SCH GM: 1 CAPSULE, LIQUID FILLED ORAL at 10:26

## 2018-04-14 RX ADMIN — ESCITALOPRAM OXALATE SCH MG: 10 TABLET, FILM COATED ORAL at 10:27

## 2018-04-14 RX ADMIN — ASPIRIN SCH MG: 81 TABLET, COATED ORAL at 10:27

## 2018-04-14 RX ADMIN — Medication SCH MG: at 21:52

## 2018-04-14 RX ADMIN — QUETIAPINE FUMARATE SCH MG: 100 TABLET ORAL at 21:52

## 2018-04-14 RX ADMIN — Medication SCH TAB: at 10:26

## 2018-04-14 RX ADMIN — TRIAMCINOLONE ACETONIDE SCH APPLIC: 5 CREAM TOPICAL at 21:53

## 2018-04-14 RX ADMIN — AMLODIPINE BESYLATE SCH MG: 10 TABLET ORAL at 10:26

## 2018-04-14 RX ADMIN — METHADONE HYDROCHLORIDE SCH MG: 40 TABLET ORAL at 07:01

## 2018-04-14 RX ADMIN — RANITIDINE SCH MG: 150 TABLET ORAL at 10:26

## 2018-04-14 RX ADMIN — TRIAMCINOLONE ACETONIDE SCH APPLIC: 5 CREAM TOPICAL at 13:51

## 2018-04-14 RX ADMIN — GABAPENTIN SCH MG: 300 CAPSULE ORAL at 07:01

## 2018-04-14 RX ADMIN — GABAPENTIN SCH MG: 300 CAPSULE ORAL at 13:51

## 2018-04-15 RX ADMIN — Medication SCH MG: at 22:05

## 2018-04-15 RX ADMIN — METHADONE HYDROCHLORIDE SCH MG: 40 TABLET ORAL at 07:06

## 2018-04-15 RX ADMIN — GABAPENTIN SCH MG: 300 CAPSULE ORAL at 22:05

## 2018-04-15 RX ADMIN — AMLODIPINE BESYLATE SCH MG: 10 TABLET ORAL at 10:13

## 2018-04-15 RX ADMIN — GABAPENTIN SCH MG: 300 CAPSULE ORAL at 07:05

## 2018-04-15 RX ADMIN — Medication SCH TAB: at 10:13

## 2018-04-15 RX ADMIN — NICOTINE SCH: 14 PATCH, EXTENDED RELEASE TRANSDERMAL at 10:13

## 2018-04-15 RX ADMIN — GABAPENTIN SCH MG: 300 CAPSULE ORAL at 13:41

## 2018-04-15 RX ADMIN — TRIAMCINOLONE ACETONIDE SCH: 5 CREAM TOPICAL at 22:06

## 2018-04-15 RX ADMIN — QUETIAPINE FUMARATE SCH MG: 100 TABLET ORAL at 22:05

## 2018-04-15 RX ADMIN — RANITIDINE SCH MG: 150 TABLET ORAL at 10:13

## 2018-04-15 RX ADMIN — OMEGA-3-ACID ETHYL ESTERS SCH GM: 1 CAPSULE, LIQUID FILLED ORAL at 10:13

## 2018-04-15 RX ADMIN — TRIAMCINOLONE ACETONIDE SCH: 5 CREAM TOPICAL at 07:09

## 2018-04-15 RX ADMIN — ASPIRIN SCH MG: 81 TABLET, COATED ORAL at 10:13

## 2018-04-15 RX ADMIN — ESCITALOPRAM OXALATE SCH MG: 10 TABLET, FILM COATED ORAL at 10:13

## 2018-04-15 RX ADMIN — TRIAMCINOLONE ACETONIDE SCH: 5 CREAM TOPICAL at 13:42

## 2018-04-16 RX ADMIN — NICOTINE SCH: 14 PATCH, EXTENDED RELEASE TRANSDERMAL at 10:51

## 2018-04-16 RX ADMIN — TRIAMCINOLONE ACETONIDE SCH: 5 CREAM TOPICAL at 07:16

## 2018-04-16 RX ADMIN — GABAPENTIN SCH MG: 300 CAPSULE ORAL at 13:41

## 2018-04-16 RX ADMIN — METHADONE HYDROCHLORIDE SCH MG: 40 TABLET ORAL at 07:06

## 2018-04-16 RX ADMIN — TRIAMCINOLONE ACETONIDE SCH APPLIC: 5 CREAM TOPICAL at 13:42

## 2018-04-16 RX ADMIN — ASPIRIN SCH MG: 81 TABLET, COATED ORAL at 10:50

## 2018-04-16 RX ADMIN — OMEGA-3-ACID ETHYL ESTERS SCH GM: 1 CAPSULE, LIQUID FILLED ORAL at 10:50

## 2018-04-16 RX ADMIN — RANITIDINE SCH MG: 150 TABLET ORAL at 10:50

## 2018-04-16 RX ADMIN — Medication SCH TAB: at 10:49

## 2018-04-16 RX ADMIN — ESCITALOPRAM OXALATE SCH MG: 10 TABLET, FILM COATED ORAL at 10:49

## 2018-04-16 RX ADMIN — GABAPENTIN SCH MG: 300 CAPSULE ORAL at 07:06

## 2018-04-16 RX ADMIN — TRIAMCINOLONE ACETONIDE SCH: 5 CREAM TOPICAL at 21:34

## 2018-04-16 RX ADMIN — GABAPENTIN SCH MG: 300 CAPSULE ORAL at 21:34

## 2018-04-16 RX ADMIN — AMLODIPINE BESYLATE SCH MG: 10 TABLET ORAL at 10:50

## 2018-04-16 RX ADMIN — QUETIAPINE FUMARATE SCH MG: 100 TABLET ORAL at 21:35

## 2018-04-16 RX ADMIN — Medication SCH MG: at 21:34

## 2018-04-17 RX ADMIN — GABAPENTIN SCH MG: 300 CAPSULE ORAL at 13:31

## 2018-04-17 RX ADMIN — RANITIDINE SCH MG: 150 TABLET ORAL at 10:32

## 2018-04-17 RX ADMIN — GABAPENTIN SCH MG: 300 CAPSULE ORAL at 21:37

## 2018-04-17 RX ADMIN — OMEGA-3-ACID ETHYL ESTERS SCH GM: 1 CAPSULE, LIQUID FILLED ORAL at 10:32

## 2018-04-17 RX ADMIN — AMLODIPINE BESYLATE SCH MG: 10 TABLET ORAL at 10:31

## 2018-04-17 RX ADMIN — METHADONE HYDROCHLORIDE SCH MG: 40 TABLET ORAL at 06:32

## 2018-04-17 RX ADMIN — ASPIRIN SCH MG: 81 TABLET, COATED ORAL at 10:31

## 2018-04-17 RX ADMIN — QUETIAPINE FUMARATE SCH MG: 100 TABLET ORAL at 21:37

## 2018-04-17 RX ADMIN — Medication SCH MG: at 21:37

## 2018-04-17 RX ADMIN — TRIAMCINOLONE ACETONIDE SCH: 5 CREAM TOPICAL at 21:38

## 2018-04-17 RX ADMIN — ESCITALOPRAM OXALATE SCH MG: 10 TABLET, FILM COATED ORAL at 10:32

## 2018-04-17 RX ADMIN — GABAPENTIN SCH MG: 300 CAPSULE ORAL at 06:33

## 2018-04-17 RX ADMIN — TRIAMCINOLONE ACETONIDE SCH APPLIC: 5 CREAM TOPICAL at 13:32

## 2018-04-17 RX ADMIN — Medication SCH TAB: at 10:31

## 2018-04-17 RX ADMIN — NICOTINE SCH: 14 PATCH, EXTENDED RELEASE TRANSDERMAL at 10:33

## 2018-04-17 RX ADMIN — TRIAMCINOLONE ACETONIDE SCH APPLIC: 5 CREAM TOPICAL at 06:32

## 2018-04-18 RX ADMIN — METHADONE HYDROCHLORIDE SCH MG: 40 TABLET ORAL at 07:01

## 2018-04-18 RX ADMIN — TRIAMCINOLONE ACETONIDE SCH APPLIC: 5 CREAM TOPICAL at 07:02

## 2018-04-18 RX ADMIN — TRIAMCINOLONE ACETONIDE SCH: 5 CREAM TOPICAL at 21:29

## 2018-04-18 RX ADMIN — HYDROXYZINE PAMOATE PRN MG: 50 CAPSULE ORAL at 10:34

## 2018-04-18 RX ADMIN — QUETIAPINE FUMARATE SCH MG: 100 TABLET ORAL at 21:30

## 2018-04-18 RX ADMIN — ESCITALOPRAM OXALATE SCH MG: 10 TABLET, FILM COATED ORAL at 10:34

## 2018-04-18 RX ADMIN — ASPIRIN SCH MG: 81 TABLET, COATED ORAL at 10:34

## 2018-04-18 RX ADMIN — GABAPENTIN SCH MG: 300 CAPSULE ORAL at 07:02

## 2018-04-18 RX ADMIN — GABAPENTIN SCH MG: 300 CAPSULE ORAL at 21:30

## 2018-04-18 RX ADMIN — AMLODIPINE BESYLATE SCH MG: 10 TABLET ORAL at 10:34

## 2018-04-18 RX ADMIN — OMEGA-3-ACID ETHYL ESTERS SCH GM: 1 CAPSULE, LIQUID FILLED ORAL at 10:33

## 2018-04-18 RX ADMIN — Medication SCH TAB: at 10:34

## 2018-04-18 RX ADMIN — GABAPENTIN SCH MG: 300 CAPSULE ORAL at 13:24

## 2018-04-18 RX ADMIN — Medication SCH MG: at 21:30

## 2018-04-18 RX ADMIN — TRIAMCINOLONE ACETONIDE SCH APPLIC: 5 CREAM TOPICAL at 13:25

## 2018-04-18 RX ADMIN — Medication PRN MG: at 21:30

## 2018-04-18 RX ADMIN — NICOTINE SCH: 14 PATCH, EXTENDED RELEASE TRANSDERMAL at 10:34

## 2018-04-18 RX ADMIN — RANITIDINE SCH MG: 150 TABLET ORAL at 10:34

## 2018-04-18 NOTE — PN
Psychiatric Progress Note


Vital Signs: 


 Vital Signs











 Period  Temp  Pulse  Resp  BP Sys/Salinas  Pulse Ox


 


 Last 24 Hr  97.7 F  61-72  18  122-154/78-87  











Date of Session: 04/18/18


Chief Complaint:: "I still have anxiety."


HPI: Pt. admitted to [facility] for alcohol, cocaine and heroin dependence.


ROS: Unremarkable.


Current Medications: 


Active Medications











Generic Name Dose Route Start Last Admin





  Trade Name Freq  PRN Reason Stop Dose Admin


 


Acetaminophen  650 mg  04/08/18 14:45  04/13/18 06:25





  Tylenol -  PO   650 mg





  Q4H PRN   Administration





  FEVER   


 


Al Hydroxide/Mg Hydroxide  30 ml  04/08/18 14:45  





  Mylanta Oral Suspension -  PO   





  Q6H PRN   





  DYSPEPSIA   


 


Albuterol Sulfate  2 puff  04/08/18 14:45  





  Ventolin Hfa Inhaler -  IH   





  Q4H PRN   





  ASTHMA   


 


Amlodipine Besylate  10 mg  04/09/18 10:00  04/17/18 10:31





  Norvasc -  PO   10 mg





  DAILY ONUR   Administration


 


Aspirin  81 mg  04/09/18 10:00  04/17/18 10:31





  Ecotrin -  PO   81 mg





  DAILY ONUR   Administration


 


Clonidine  0.1 mg  04/09/18 10:00  04/17/18 10:32





  Catapres -  PO   0.1 mg





  DAILY ONUR   Administration


 


Escitalopram Oxalate  10 mg  04/09/18 12:15  04/17/18 10:32





  Lexapro -  PO   10 mg





  DAILY ONUR   Administration


 


Eucalyptus/Menthol/Phenol/Sorbitol  1 each  04/08/18 14:45  





  Cepastat Lozenge -  MM   





  Q4H PRN   





  SORE THROAT   


 


Gabapentin  300 mg  04/08/18 22:00  04/18/18 07:02





  Neurontin -  PO   300 mg





  TID ONUR   Administration


 


Guaifenesin  10 ml  04/08/18 14:45  





  Robitussin Dm -  PO   





  Q6H PRN   





  COUGH   


 


Hydroxyzine Pamoate  50 mg  04/09/18 12:03  04/13/18 06:25





  Vistaril -  PO   50 mg





  Q4H PRN   Administration





  ANXIETY   


 


Loperamide HCl  4 mg  04/08/18 14:45  





  Imodium -  PO   





  Q6H PRN   





  DIARRHEA   


 


Magnesium Citrate  300 ml  04/08/18 14:45  





  Citroma -  PO   





  Q48H PRN   





  CONSTIPATION   


 


Magnesium Hydroxide  30 ml  04/08/18 14:45  





  Milk Of Magnesia -  PO   





  DAILY PRN   





  CONSTIPATION   


 


Melatonin  5 mg  04/08/18 22:00  





  Melatonin  PO   





  HS PRN   





  INSOMNIA   


 


Methadone HCl 40 mg/ Methadone  60 mg  04/16/18 06:30  04/18/18 07:01





  HCl 20 mg  PO  04/23/18 06:29  60 mg





  DAILY@0600 ONUR   Administration


 


Nicotine  14 mg  04/09/18 10:00  04/17/18 10:33





  Nicoderm Patch -  TD   Not Given





  DAILY ONUR   


 


Nicotine Polacrilex  2 mg  04/08/18 14:45  





  Nicorette Gum -  BUC   





  Q2H PRN   





  NICOTINE REPLACEMENT RX   


 


Omega-3-Acid Ethyl Esters  1 gm  04/11/18 10:00  04/17/18 10:32





  Lovaza -  PO   1 gm





  DAILY ONUR   Administration


 


Prenatal Multivit/Folic Acid/Iron  1 tab  04/09/18 10:00  04/17/18 10:31





  Prenatal Vitamins (Sjr) -  PO   1 tab





  DAILY ONUR   Administration


 


Pseudoephedrine/Triprolidine  1 combo  04/08/18 14:45  





  Actifed -  PO   





  TID PRN   





  NASAL CONGESTION   


 


Quetiapine Fumarate  100 mg  04/08/18 22:00  04/17/18 21:37





  Seroquel -  PO   100 mg





  HS ONUR   Administration


 


Ranitidine HCl  150 mg  04/11/18 10:00  04/17/18 10:32





  Zantac -  PO   150 mg





  DAILY ONUR   Administration


 


Thiamine HCl  100 mg  04/08/18 22:00  04/17/18 21:37





  Vitamin B1 -  PO   100 mg





  HS ONUR   Administration


 


Triamcinolone Acetonide  1 applic  04/14/18 14:00  04/18/18 07:02





  Aristocort 0.5% Cream -  TP   1 applic





  TID ONUR   Administration











Medication(s) Change(s): No.


Current Side Effect: No


Lab tests ordered: No


Lab tests reviewed: Yes


Provider note:: Writer met with patient concerning psychiatric follow up. Pt. 

was seen in detox by writer on 4/04/18. Pt. mood is hopeful and affect much 

brighter then what was seen in detox. Today, patient reports improved mood, 

increase appetite and adequate sleep. Pt. continues to report anxiety but is 

unable to elaborate on what triggers the anxiety. Pt. educated on the 

properties of lexapro. Writer spoke to patient about the addition of buspar but 

patient refused. Reports "not feeling well" when she took buspar. Pt. 

requesting celexa but informed by writer that effectiveness of lexapro 

medication will take approximately four weeks and does not feel that at this 

time starting celexa is necessary. Pt. also made aware that vistaril 50mg prn 

is orderd for anxiety/agitation. Pt. unaware of this medication. Pt. 

recommended to accept scheduled medications and if anxiety continues to then 

request vistaril 50mg. Pt. also encouraged to utilize her coping skills to help 

de escalate her anxiety. Pt. satisifed and receptive to feedback. Will continue 

to monitor.


Total face to face time:: 30





Mental Status Exam





- Mental Status Exam


Alert and Oriented to: Time, Place, Person


Cognitive Function: Good


Patient Appearance: Well Groomed


Mood: Hopeful


Affect: Appropriate, Mood Congruent


Patient Behavior: Appropriate, Cooperative


Speech Pattern: Clear, Appropriate


Voice Loudness: Normal


Thought Process: Intact


Thought Disorder: Not Present


Hallucinations: Denies


Suicidal Ideation: Denies


Homicidal Ideation: Denies


Insight/Judgement: Poor


Sleep: Fair


Appetite: Good


Muscle strength/Tone: Normal


Gait/Station: Normal





Psychiatric Treatment Plan





- Problem List


(1) Alcohol dependence


Current Visit: Yes   





(2) Asthma


Current Visit: Yes   


Qualifiers: 


   Asthma severity: mild   Asthma persistence: intermittent   Asthma 

complication type: with status asthmaticus   Qualified Code(s): J45.22 - Mild 

intermittent asthma with status asthmaticus   





(3) Cocaine dependence


Current Visit: Yes   


Qualifiers: 


   Substance use status: uncomplicated   Qualified Code(s): F14.20 - Cocaine 

dependence, uncomplicated   





(4) GERD (gastroesophageal reflux disease)


Current Visit: Yes   


Qualifiers: 


   Esophagitis presence: without esophagitis   Qualified Code(s): K21.9 - Gastro

-esophageal reflux disease without esophagitis   





(5) Gastritis


Current Visit: Yes   


Qualifiers: 


   Chronicity: unspecified   Gastritis bleeding: without bleeding 





(6) Hypercholesteremia


Current Visit: Yes   





(7) Nicotine dependence


Current Visit: Yes   


Qualifiers: 


   Nicotine product type: cigarettes   Substance use status: in withdrawal   

Qualified Code(s): F17.213 - Nicotine dependence, cigarettes, with withdrawal   





(8) Hypertension


Current Visit: Yes   


Qualifiers: 


   Hypertension type: essential hypertension   Qualified Code(s): I10 - 

Essential (primary) hypertension   





(9) Methadone maintenance therapy patient


Current Visit: Yes   Comment: 60 mg po daily   





(10) PTSD (post-traumatic stress disorder)


Current Visit: Yes   





(11) Bipolar disorder


Current Visit: Yes   


Qualifiers: 


   Current bipolar episode type: depressed   Current episode severity: 

unspecified 


Comment: History.

## 2018-04-19 RX ADMIN — NICOTINE SCH: 14 PATCH, EXTENDED RELEASE TRANSDERMAL at 10:22

## 2018-04-19 RX ADMIN — HYDROXYZINE PAMOATE PRN MG: 50 CAPSULE ORAL at 10:23

## 2018-04-19 RX ADMIN — GABAPENTIN SCH MG: 300 CAPSULE ORAL at 21:40

## 2018-04-19 RX ADMIN — HYDROXYZINE PAMOATE PRN MG: 50 CAPSULE ORAL at 21:42

## 2018-04-19 RX ADMIN — TRIAMCINOLONE ACETONIDE SCH APPLIC: 5 CREAM TOPICAL at 13:11

## 2018-04-19 RX ADMIN — GABAPENTIN SCH MG: 300 CAPSULE ORAL at 13:10

## 2018-04-19 RX ADMIN — RANITIDINE SCH MG: 150 TABLET ORAL at 10:21

## 2018-04-19 RX ADMIN — HYDROXYZINE PAMOATE PRN MG: 50 CAPSULE ORAL at 15:57

## 2018-04-19 RX ADMIN — TRIAMCINOLONE ACETONIDE SCH: 5 CREAM TOPICAL at 06:37

## 2018-04-19 RX ADMIN — TRIAMCINOLONE ACETONIDE SCH: 5 CREAM TOPICAL at 21:41

## 2018-04-19 RX ADMIN — METHADONE HYDROCHLORIDE SCH MG: 40 TABLET ORAL at 06:36

## 2018-04-19 RX ADMIN — ASPIRIN SCH MG: 81 TABLET, COATED ORAL at 10:21

## 2018-04-19 RX ADMIN — QUETIAPINE FUMARATE SCH MG: 100 TABLET ORAL at 21:40

## 2018-04-19 RX ADMIN — Medication SCH TAB: at 10:21

## 2018-04-19 RX ADMIN — AMLODIPINE BESYLATE SCH MG: 10 TABLET ORAL at 10:21

## 2018-04-19 RX ADMIN — OMEGA-3-ACID ETHYL ESTERS SCH GM: 1 CAPSULE, LIQUID FILLED ORAL at 10:21

## 2018-04-19 RX ADMIN — GABAPENTIN SCH MG: 300 CAPSULE ORAL at 06:37

## 2018-04-19 RX ADMIN — ESCITALOPRAM OXALATE SCH MG: 10 TABLET, FILM COATED ORAL at 10:21

## 2018-04-19 RX ADMIN — NICOTINE POLACRILEX PRN MG: 2 GUM, CHEWING ORAL at 10:24

## 2018-04-19 RX ADMIN — Medication SCH MG: at 21:40

## 2018-04-20 RX ADMIN — GABAPENTIN SCH MG: 300 CAPSULE ORAL at 21:38

## 2018-04-20 RX ADMIN — ESCITALOPRAM OXALATE SCH MG: 10 TABLET, FILM COATED ORAL at 10:37

## 2018-04-20 RX ADMIN — NICOTINE SCH: 14 PATCH, EXTENDED RELEASE TRANSDERMAL at 10:37

## 2018-04-20 RX ADMIN — TRIAMCINOLONE ACETONIDE SCH: 5 CREAM TOPICAL at 07:16

## 2018-04-20 RX ADMIN — AMLODIPINE BESYLATE SCH MG: 10 TABLET ORAL at 10:37

## 2018-04-20 RX ADMIN — HYDROXYZINE PAMOATE PRN MG: 50 CAPSULE ORAL at 12:11

## 2018-04-20 RX ADMIN — TRIAMCINOLONE ACETONIDE SCH: 5 CREAM TOPICAL at 21:39

## 2018-04-20 RX ADMIN — GABAPENTIN SCH MG: 300 CAPSULE ORAL at 06:57

## 2018-04-20 RX ADMIN — ASPIRIN SCH MG: 81 TABLET, COATED ORAL at 10:37

## 2018-04-20 RX ADMIN — OMEGA-3-ACID ETHYL ESTERS SCH GM: 1 CAPSULE, LIQUID FILLED ORAL at 10:36

## 2018-04-20 RX ADMIN — NICOTINE POLACRILEX PRN MG: 2 GUM, CHEWING ORAL at 10:40

## 2018-04-20 RX ADMIN — RANITIDINE SCH MG: 150 TABLET ORAL at 10:36

## 2018-04-20 RX ADMIN — METHADONE HYDROCHLORIDE SCH MG: 40 TABLET ORAL at 06:57

## 2018-04-20 RX ADMIN — Medication SCH TAB: at 10:36

## 2018-04-20 RX ADMIN — QUETIAPINE FUMARATE SCH MG: 100 TABLET ORAL at 21:38

## 2018-04-20 RX ADMIN — Medication SCH MG: at 21:38

## 2018-04-20 RX ADMIN — GABAPENTIN SCH MG: 300 CAPSULE ORAL at 13:31

## 2018-04-20 RX ADMIN — ACETAMINOPHEN PRN MG: 325 TABLET ORAL at 06:58

## 2018-04-20 RX ADMIN — TRIAMCINOLONE ACETONIDE SCH: 5 CREAM TOPICAL at 13:32

## 2018-04-21 RX ADMIN — RANITIDINE SCH MG: 150 TABLET ORAL at 10:32

## 2018-04-21 RX ADMIN — METHADONE HYDROCHLORIDE SCH MG: 40 TABLET ORAL at 06:41

## 2018-04-21 RX ADMIN — HYDROXYZINE PAMOATE PRN MG: 50 CAPSULE ORAL at 21:34

## 2018-04-21 RX ADMIN — HYDROXYZINE PAMOATE PRN MG: 50 CAPSULE ORAL at 06:43

## 2018-04-21 RX ADMIN — NICOTINE SCH: 14 PATCH, EXTENDED RELEASE TRANSDERMAL at 10:32

## 2018-04-21 RX ADMIN — OMEGA-3-ACID ETHYL ESTERS SCH GM: 1 CAPSULE, LIQUID FILLED ORAL at 10:32

## 2018-04-21 RX ADMIN — GABAPENTIN SCH MG: 300 CAPSULE ORAL at 21:32

## 2018-04-21 RX ADMIN — Medication SCH MG: at 21:35

## 2018-04-21 RX ADMIN — GABAPENTIN SCH MG: 300 CAPSULE ORAL at 06:41

## 2018-04-21 RX ADMIN — TRIAMCINOLONE ACETONIDE SCH: 5 CREAM TOPICAL at 21:34

## 2018-04-21 RX ADMIN — AMLODIPINE BESYLATE SCH MG: 10 TABLET ORAL at 10:31

## 2018-04-21 RX ADMIN — HYDROXYZINE PAMOATE PRN MG: 50 CAPSULE ORAL at 10:33

## 2018-04-21 RX ADMIN — GABAPENTIN SCH MG: 300 CAPSULE ORAL at 13:33

## 2018-04-21 RX ADMIN — TRIAMCINOLONE ACETONIDE SCH APPLIC: 5 CREAM TOPICAL at 06:41

## 2018-04-21 RX ADMIN — ASPIRIN SCH MG: 81 TABLET, COATED ORAL at 10:31

## 2018-04-21 RX ADMIN — ESCITALOPRAM OXALATE SCH MG: 10 TABLET, FILM COATED ORAL at 10:32

## 2018-04-21 RX ADMIN — Medication SCH TAB: at 10:32

## 2018-04-21 RX ADMIN — QUETIAPINE FUMARATE SCH MG: 100 TABLET ORAL at 21:32

## 2018-04-21 RX ADMIN — NICOTINE POLACRILEX PRN MG: 2 GUM, CHEWING ORAL at 13:42

## 2018-04-21 RX ADMIN — TRIAMCINOLONE ACETONIDE SCH APPLIC: 5 CREAM TOPICAL at 13:41

## 2018-04-21 RX ADMIN — NICOTINE POLACRILEX PRN MG: 2 GUM, CHEWING ORAL at 10:34

## 2018-04-22 RX ADMIN — HYDROXYZINE PAMOATE PRN MG: 50 CAPSULE ORAL at 10:26

## 2018-04-22 RX ADMIN — METHADONE HYDROCHLORIDE SCH MG: 40 TABLET ORAL at 06:16

## 2018-04-22 RX ADMIN — ASPIRIN SCH MG: 81 TABLET, COATED ORAL at 10:25

## 2018-04-22 RX ADMIN — NICOTINE POLACRILEX PRN MG: 2 GUM, CHEWING ORAL at 10:27

## 2018-04-22 RX ADMIN — TRIAMCINOLONE ACETONIDE SCH APPLIC: 5 CREAM TOPICAL at 06:17

## 2018-04-22 RX ADMIN — GABAPENTIN SCH MG: 300 CAPSULE ORAL at 14:11

## 2018-04-22 RX ADMIN — OMEGA-3-ACID ETHYL ESTERS SCH GM: 1 CAPSULE, LIQUID FILLED ORAL at 10:25

## 2018-04-22 RX ADMIN — ESCITALOPRAM OXALATE SCH MG: 10 TABLET, FILM COATED ORAL at 10:25

## 2018-04-22 RX ADMIN — Medication SCH MG: at 21:35

## 2018-04-22 RX ADMIN — Medication SCH TAB: at 10:25

## 2018-04-22 RX ADMIN — HYDROXYZINE PAMOATE PRN MG: 50 CAPSULE ORAL at 21:36

## 2018-04-22 RX ADMIN — HYDROXYZINE PAMOATE PRN MG: 50 CAPSULE ORAL at 14:12

## 2018-04-22 RX ADMIN — AMLODIPINE BESYLATE SCH MG: 10 TABLET ORAL at 10:25

## 2018-04-22 RX ADMIN — NICOTINE SCH: 14 PATCH, EXTENDED RELEASE TRANSDERMAL at 10:25

## 2018-04-22 RX ADMIN — GABAPENTIN SCH MG: 300 CAPSULE ORAL at 06:16

## 2018-04-22 RX ADMIN — Medication PRN MG: at 21:36

## 2018-04-22 RX ADMIN — TRIAMCINOLONE ACETONIDE SCH: 5 CREAM TOPICAL at 21:51

## 2018-04-22 RX ADMIN — GABAPENTIN SCH MG: 300 CAPSULE ORAL at 21:35

## 2018-04-22 RX ADMIN — QUETIAPINE FUMARATE SCH MG: 100 TABLET ORAL at 21:35

## 2018-04-22 RX ADMIN — HYDROXYZINE PAMOATE PRN MG: 50 CAPSULE ORAL at 06:16

## 2018-04-22 RX ADMIN — TRIAMCINOLONE ACETONIDE SCH: 5 CREAM TOPICAL at 14:11

## 2018-04-22 RX ADMIN — RANITIDINE SCH MG: 150 TABLET ORAL at 10:25

## 2018-04-23 RX ADMIN — TRIAMCINOLONE ACETONIDE SCH: 5 CREAM TOPICAL at 13:08

## 2018-04-23 RX ADMIN — GABAPENTIN SCH MG: 300 CAPSULE ORAL at 13:08

## 2018-04-23 RX ADMIN — NICOTINE POLACRILEX PRN MG: 2 GUM, CHEWING ORAL at 10:26

## 2018-04-23 RX ADMIN — AMLODIPINE BESYLATE SCH MG: 10 TABLET ORAL at 10:25

## 2018-04-23 RX ADMIN — METHADONE HYDROCHLORIDE SCH MG: 40 TABLET ORAL at 06:31

## 2018-04-23 RX ADMIN — NICOTINE SCH: 14 PATCH, EXTENDED RELEASE TRANSDERMAL at 10:26

## 2018-04-23 RX ADMIN — ASPIRIN SCH MG: 81 TABLET, COATED ORAL at 10:25

## 2018-04-23 RX ADMIN — Medication PRN MG: at 21:18

## 2018-04-23 RX ADMIN — QUETIAPINE FUMARATE SCH MG: 100 TABLET ORAL at 21:17

## 2018-04-23 RX ADMIN — TRIAMCINOLONE ACETONIDE SCH: 5 CREAM TOPICAL at 06:31

## 2018-04-23 RX ADMIN — GABAPENTIN SCH MG: 300 CAPSULE ORAL at 21:17

## 2018-04-23 RX ADMIN — ESCITALOPRAM OXALATE SCH MG: 10 TABLET, FILM COATED ORAL at 10:25

## 2018-04-23 RX ADMIN — RANITIDINE SCH MG: 150 TABLET ORAL at 10:25

## 2018-04-23 RX ADMIN — Medication SCH TAB: at 10:25

## 2018-04-23 RX ADMIN — NICOTINE POLACRILEX PRN MG: 2 GUM, CHEWING ORAL at 13:09

## 2018-04-23 RX ADMIN — Medication SCH MG: at 21:17

## 2018-04-23 RX ADMIN — OMEGA-3-ACID ETHYL ESTERS SCH GM: 1 CAPSULE, LIQUID FILLED ORAL at 10:25

## 2018-04-23 RX ADMIN — TRIAMCINOLONE ACETONIDE SCH: 5 CREAM TOPICAL at 21:50

## 2018-04-23 RX ADMIN — HYDROXYZINE PAMOATE PRN MG: 50 CAPSULE ORAL at 06:30

## 2018-04-23 RX ADMIN — GABAPENTIN SCH MG: 300 CAPSULE ORAL at 06:30

## 2018-04-24 VITALS — SYSTOLIC BLOOD PRESSURE: 137 MMHG | HEART RATE: 72 BPM | DIASTOLIC BLOOD PRESSURE: 86 MMHG

## 2018-04-24 VITALS — TEMPERATURE: 97.7 F

## 2018-04-24 RX ADMIN — ESCITALOPRAM OXALATE SCH MG: 10 TABLET, FILM COATED ORAL at 09:02

## 2018-04-24 RX ADMIN — TRIAMCINOLONE ACETONIDE SCH APPLIC: 5 CREAM TOPICAL at 06:49

## 2018-04-24 RX ADMIN — RANITIDINE SCH MG: 150 TABLET ORAL at 09:02

## 2018-04-24 RX ADMIN — METHADONE HYDROCHLORIDE SCH MG: 40 TABLET ORAL at 06:48

## 2018-04-24 RX ADMIN — GABAPENTIN SCH MG: 300 CAPSULE ORAL at 06:49

## 2018-04-24 RX ADMIN — OMEGA-3-ACID ETHYL ESTERS SCH GM: 1 CAPSULE, LIQUID FILLED ORAL at 09:02

## 2018-04-24 RX ADMIN — NICOTINE SCH: 14 PATCH, EXTENDED RELEASE TRANSDERMAL at 09:03

## 2018-04-24 RX ADMIN — Medication SCH TAB: at 09:02

## 2018-04-24 RX ADMIN — ASPIRIN SCH MG: 81 TABLET, COATED ORAL at 09:02

## 2018-04-24 RX ADMIN — AMLODIPINE BESYLATE SCH MG: 10 TABLET ORAL at 09:02

## 2018-04-24 NOTE — PN
Psychiatric Progress Note


Vital Signs: 


 Vital Signs











 Period  Temp  Pulse  Resp  BP Sys/Salinas  Pulse Ox


 


 Last 24 Hr  97.7 F-97.7 F  66-72  17-18  137-172/86-95  











Date of Session: 04/24/18


Chief Complaint:: discharge


HPI: The patient has addressed alcohol, opioid, cocaine, nicotine dependence 

comorbid PTSD and Bipolar Disorder.


ROS: heart murmur and BA.


Current Side Effect: No


Lab tests ordered: No


Lab tests reviewed: Yes


Provider note:: Patient has completed today his treatment and she met goals, 

will continue to address his issues at [address] [place] [address]  and as a residentila 

at [facility]. She gained insights into  addiction,  focused on 

recognition of negative conseqiences relapse would have over major life areas, 

including physical and mental health. Patient continues to finding that 

Seroquel and Lexapro effective in his sleep improvement, scripts  x 30 days 

provided, patient apperas stable for discharge today.


Total face to face time:: 15





Mental Status Exam





- Mental Status Exam


Alert and Oriented to: Time, Place, Person


Cognitive Function: Grossly Intact


Patient Appearance: Well Groomed


Mood: Hopeful


Affect: Mood Congruent


Patient Behavior: Appropriate, Cooperative


Speech Pattern: Appropriate


Voice Loudness: Normal


Thought Process: Goal Oriented


Thought Disorder: Not Present


Hallucinations: Denies


Suicidal Ideation: Denies


Homicidal Ideation: Denies


Insight/Judgement: Fair


Sleep: Fair


Appetite: Fair





Psychiatric Treatment Plan





- Problem List








(2) Cocaine dependence


Qualifiers: 


   Substance use status: uncomplicated   Qualified Code(s): F14.20 - Cocaine 

dependence, uncomplicated   








(4) Bipolar disorder


Qualifiers: 


   Current bipolar episode type: depressed   Current episode severity: 

unspecified 


Comment: History.

## 2021-02-01 ENCOUNTER — OUTPATIENT (OUTPATIENT)
Dept: OUTPATIENT SERVICES | Facility: HOSPITAL | Age: 56
LOS: 1 days | End: 2021-02-01
Payer: MEDICAID

## 2021-02-12 DIAGNOSIS — Z71.89 OTHER SPECIFIED COUNSELING: ICD-10-CM

## 2021-08-01 PROCEDURE — G9005: CPT
